# Patient Record
Sex: FEMALE | Race: WHITE | Employment: STUDENT | URBAN - METROPOLITAN AREA
[De-identification: names, ages, dates, MRNs, and addresses within clinical notes are randomized per-mention and may not be internally consistent; named-entity substitution may affect disease eponyms.]

---
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
## 2023-01-06 ENCOUNTER — OFFICE VISIT (OUTPATIENT)
Dept: FAMILY MEDICINE CLINIC | Age: 18
End: 2023-01-06

## 2023-01-06 VITALS
OXYGEN SATURATION: 98 % | RESPIRATION RATE: 16 BRPM | DIASTOLIC BLOOD PRESSURE: 76 MMHG | HEIGHT: 66 IN | WEIGHT: 230 LBS | TEMPERATURE: 98.2 F | SYSTOLIC BLOOD PRESSURE: 120 MMHG | BODY MASS INDEX: 36.96 KG/M2 | HEART RATE: 80 BPM

## 2023-01-06 DIAGNOSIS — M19.90 ARTHRITIS: ICD-10-CM

## 2023-01-06 DIAGNOSIS — Z76.89 ESTABLISHING CARE WITH NEW DOCTOR, ENCOUNTER FOR: Primary | ICD-10-CM

## 2023-01-06 DIAGNOSIS — B96.89 ACUTE BACTERIAL SINUSITIS: ICD-10-CM

## 2023-01-06 DIAGNOSIS — F41.9 ANXIETY: ICD-10-CM

## 2023-01-06 DIAGNOSIS — G43.911 INTRACTABLE MIGRAINE WITH STATUS MIGRAINOSUS, UNSPECIFIED MIGRAINE TYPE: ICD-10-CM

## 2023-01-06 DIAGNOSIS — J01.90 ACUTE BACTERIAL SINUSITIS: ICD-10-CM

## 2023-01-06 RX ORDER — INDOMETHACIN 75 MG/1
CAPSULE, EXTENDED RELEASE ORAL
COMMUNITY
Start: 2022-12-21

## 2023-01-06 RX ORDER — SUCRALFATE 1 G/1
TABLET ORAL
COMMUNITY
Start: 2022-12-12

## 2023-01-06 RX ORDER — RIMEGEPANT SULFATE 75 MG/75MG
TABLET, ORALLY DISINTEGRATING ORAL
COMMUNITY
Start: 2021-11-08

## 2023-01-06 RX ORDER — ATOGEPANT 60 MG/1
TABLET ORAL
COMMUNITY
Start: 2022-12-15

## 2023-01-06 RX ORDER — LORATADINE 10 MG/1
CAPSULE, LIQUID FILLED ORAL
COMMUNITY
Start: 2022-04-20

## 2023-01-06 RX ORDER — CETIRIZINE HYDROCHLORIDE 10 MG/1
TABLET ORAL
COMMUNITY
End: 2023-01-06

## 2023-01-06 RX ORDER — ZONISAMIDE 100 MG/1
CAPSULE ORAL
COMMUNITY
Start: 2022-12-21

## 2023-01-06 RX ORDER — AZITHROMYCIN 250 MG/1
250 TABLET, FILM COATED ORAL SEE ADMIN INSTRUCTIONS
Qty: 6 TABLET | Refills: 0 | Status: SHIPPED | OUTPATIENT
Start: 2023-01-06 | End: 2023-01-11

## 2023-01-06 RX ORDER — CHLORAL HYDRATE 500 MG
CAPSULE ORAL
COMMUNITY
Start: 2022-04-20

## 2023-01-06 RX ORDER — ALBUTEROL SULFATE 90 UG/1
AEROSOL, METERED RESPIRATORY (INHALATION)
COMMUNITY
Start: 2022-07-29

## 2023-01-06 RX ORDER — RIZATRIPTAN BENZOATE 10 MG/1
TABLET ORAL
COMMUNITY
Start: 2022-11-30

## 2023-01-06 RX ORDER — LASMIDITAN 100 MG/1
TABLET ORAL
COMMUNITY
Start: 2022-12-02

## 2023-01-06 RX ORDER — FLUOXETINE 10 MG/1
TABLET, FILM COATED ORAL
COMMUNITY
Start: 2022-12-21

## 2023-01-06 RX ORDER — NORETHINDRONE ACETATE/ETHINYL ESTRADIOL AND FERROUS FUMARATE 1MG-20(24)
KIT ORAL
COMMUNITY
Start: 2022-12-26

## 2023-01-06 RX ORDER — AZELASTINE 1 MG/ML
1 SPRAY, METERED NASAL 2 TIMES DAILY
COMMUNITY
Start: 2022-07-29 | End: 2023-07-29

## 2023-01-06 RX ORDER — ADALIMUMAB 40MG/0.4ML
KIT SUBCUTANEOUS
COMMUNITY
Start: 2022-12-05

## 2023-01-06 SDOH — ECONOMIC STABILITY: FOOD INSECURITY: WITHIN THE PAST 12 MONTHS, THE FOOD YOU BOUGHT JUST DIDN'T LAST AND YOU DIDN'T HAVE MONEY TO GET MORE.: NEVER TRUE

## 2023-01-06 SDOH — ECONOMIC STABILITY: FOOD INSECURITY: WITHIN THE PAST 12 MONTHS, YOU WORRIED THAT YOUR FOOD WOULD RUN OUT BEFORE YOU GOT MONEY TO BUY MORE.: NEVER TRUE

## 2023-01-06 ASSESSMENT — PATIENT HEALTH QUESTIONNAIRE - PHQ9
SUM OF ALL RESPONSES TO PHQ QUESTIONS 1-9: 0
1. LITTLE INTEREST OR PLEASURE IN DOING THINGS: 0
SUM OF ALL RESPONSES TO PHQ QUESTIONS 1-9: 0
SUM OF ALL RESPONSES TO PHQ QUESTIONS 1-9: 0
2. FEELING DOWN, DEPRESSED OR HOPELESS: 0
SUM OF ALL RESPONSES TO PHQ QUESTIONS 1-9: 0
SUM OF ALL RESPONSES TO PHQ9 QUESTIONS 1 & 2: 0

## 2023-01-06 ASSESSMENT — SOCIAL DETERMINANTS OF HEALTH (SDOH): HOW HARD IS IT FOR YOU TO PAY FOR THE VERY BASICS LIKE FOOD, HOUSING, MEDICAL CARE, AND HEATING?: NOT VERY HARD

## 2023-01-06 NOTE — PROGRESS NOTES
[facility] PRIMARY CARE  [address] [place] [address]  [place] [address]  Dept: [phone]  Dept Fax: [phone]     NAME: Sujit Baird        :  2005        MRN:  [id number]    Chief Complaint   Patient presents with    New Patient     Previous pcp was Dr. Andre Cobb    Pharyngitis    Nasal Congestion     Started at the beginning of the week. History of Present Illness  Sujit Baird is a 21 y.o. female who presents today to [facility] care. Patient with several chronic conditions for which she follows with several specialist through [address] [place] [address] including neurology, rheumatology, and cardiology. Sees orthopedics and allergy through [facility]. Hx includes chronic migraines, juvenile arthritis, and various orthopedic issues require surgery. Sore throat and congestion for the last week, her mom and brother were both sick with a lingering URI throughout [address] and were both treated with an antibiotic after several weeks of illness. Since she is currently taking humira, will proactively treat her URI with antibiotics. Review of Systems  Please see HPI above.      Medical History   Past Medical History:   Diagnosis Date    Migraine        Surgical History   Past Surgical History:   Procedure Laterality Date    LEG SURGERY Right     Christie    WISDOM TOOTH EXTRACTION         Family History  Family History   Problem Relation Age of Onset    Other Mother     High Blood Pressure Mother     Hypothyroidism Mother     Anxiety Disorder Brother        Social History  Social History     Tobacco Use    Smoking status: Never    Smokeless tobacco: Never   Substance Use Topics    Alcohol use: Yes     Comment: occasionally       Home Medications  Current Outpatient Medications   Medication Sig Dispense Refill    HUMIRA PEN 40 MG/0.4ML PNKT       albuterol sulfate HFA (PROVENTIL;VENTOLIN;PROAIR) 108 (90 Base) MCG/ACT inhaler Inhale into the lungs      QULIPTA 60 MG TABS Cholecalciferol 50 MCG (2000 UT) TABS Take by mouth      loratadine (CLARITIN) 10 MG capsule Take by mouth      FLUoxetine (PROZAC) 10 MG tablet TAKE 1 TABLET BY MOUTH ONCE DAILY IN THE MORNING FOR 90 DAYS      indomethacin (INDOCIN SR) 75 MG extended release capsule TAKE 1 CAPSULE BY MOUTH ONCE DAILY WITH A MEAL      REYVOW 100 MG TABS TAKE 1 TO 2 TABLETS BY MOUTH ONCE DAILY AS NEEDED FOR HEADACHE      GALI 24 FE 1-20 MG-MCG(24) TABS TAKE 1 TABLET BY MOUTH ONCE DAILY      Omega-3 Fatty Acids (FISH OIL) 1000 MG capsule Take by mouth      Rimegepant Sulfate (NURTEC) 75 MG TBDP Take by mouth      rizatriptan (MAXALT) 10 MG tablet TAKE ONE TABLET BY MOUTH AT ONSET OF HEADACHE, MAY REPEAT EVERY 2 HOURS AS NEEDED, MAXIMUM OF 3 TABLET PER 24 HOURS      sucralfate (CARAFATE) 1 GM tablet TAKE 1 TABLET BY MOUTH WITH INDOCIN      azelastine (ASTELIN) 0.1 % nasal spray 1 spray by Nasal route 2 times daily      zonisamide (ZONEGRAN) 100 MG capsule TAKE 2 CAPSULES BY MOUTH AT BEDTIME      azithromycin (ZITHROMAX) 250 MG tablet Take 1 tablet by mouth See Admin Instructions for 5 days 500mg on day 1 followed by 250mg on days 2 - 5 6 tablet 0     No current facility-administered medications for this visit. Allergies  Allergies   Allergen Reactions    Cobalt     Dust Mite Extract        Objective  Vitals:    01/06/23 0832   BP: 120/76   Pulse: 80   Resp: 16   Temp: 98.2 °F (36.8 °C)   TempSrc: Temporal   SpO2: 98%   Weight: 230 lb (104.3 kg)   Height: 5' 6\" (1.676 m)        Physical Exam:  General: Awake, alert, and oriented to person, place, time, and purpose, appears stated age and cooperative, No acute distress  Head: Normocephalic, atraumatic  Eyes: conjunctivae/corneas clear, EOM's intact. Neck: symmetrical, trachea midline  Back: symmetric, ROM normal, No CVA tenderness.   Lungs: clear to auscultation bilaterally without wheezes, rales, or rhonchi  Heart: regular rate and rhythm, S1, S2 normal, no murmur, click, rub or gallop  Abdomen: soft, non-tender; bowel sounds normal; no masses,  no organomegaly  Extremities: atraumatic, no cyanosis, no edema  Skin: color, texture, turgor within normal limits. Neurologic: speech appropriate, moves all 4 extremities, normal muscle strength and tone, CN 2-12 grossly intact    Labs  No results found for: WBC, HGB, HCT, PLT, NA, K, CL, CREATININE, BUN, CO2, GLUCOSE, ALT, AST, INR  No results found for: TSH  No results found for: TRIG  No results found for: HDL  No results found for: LDLCALC  No results found for: LABA1C  No results found for: INR, PROTIME   *All recent labs were reviewed. Please see electronic chart for a more comprehensive set of labs    Radiology  No results found. Health Maintenance Due   Topic Date Due    HIV screen  Never done    Chlamydia/GC screen  Never done    Hepatitis C screen  Never done    COVID-19 Vaccine (3 - Booster for Pfizer series) 06/30/2021    Flu vaccine (1) 08/01/2022         Assessment and Plan  Joe Carmona presents today to establish care. Liza was seen today for new patient, pharyngitis and nasal congestion. 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  Will request records as patient is unsure of specific diagnoses received. Records are available form [facility] and these were reviewed. - Non-radiographic axial spondyloarthritis, low grade otic glioma, right temporal lobe vascular malformation are diagnoses mentions throughout orthopedic and neurology records available through [email or web address] and/or home exercises printed for patient's review and were included in patient instructions on his/her After Visit Summary and given to patient at the end of visit. Counseled regarding above diagnosis, including possible risks and complications, especially if left uncontrolled. Counseled regarding the possible side effects, risks, benefits and alternatives to treatment; patient and/or guardian verbalizes understanding, agrees, feels comfortable with and wishes to proceed with above treatment plan. Advised patient to call [employer] new medication issues, and read all Rx info from pharmacy to assure aware of all possible risks and side effects of medication before taking. Reviewed age and gender appropriate health screening exams and vaccinations. Advised patient regarding importance of keeping up with recommended health maintenance and to schedule as soon as possible if overdue, as this is important in assessing for undiagnosed pathology, especially cancer, as well as protecting against potentially harmful/life threatening disease. Patient verbalizes understanding and agrees with above counseling, assessment and plan. All questions answered.     Karin Akins,

## 2023-08-16 ENCOUNTER — APPOINTMENT (OUTPATIENT)
Dept: LAB | Facility: LAB | Age: 18
End: 2023-08-16
Payer: COMMERCIAL

## 2023-08-16 LAB
ALANINE AMINOTRANSFERASE (SGPT) (U/L) IN SER/PLAS: 25 U/L (ref 7–45)
ALBUMIN (G/DL) IN SER/PLAS: 4.8 G/DL (ref 3.4–5)
ALKALINE PHOSPHATASE (U/L) IN SER/PLAS: 49 U/L (ref 33–110)
ANION GAP IN SER/PLAS: 16 MMOL/L (ref 10–20)
ASPARTATE AMINOTRANSFERASE (SGOT) (U/L) IN SER/PLAS: 22 U/L (ref 9–39)
BILIRUBIN TOTAL (MG/DL) IN SER/PLAS: 1.7 MG/DL (ref 0–1.2)
C PEPTIDE (NG/ML) IN SER/PLAS: 2.8 NG/ML (ref 0.7–3.9)
CALCIUM (MG/DL) IN SER/PLAS: 9.9 MG/DL (ref 8.6–10.6)
CARBON DIOXIDE, TOTAL (MMOL/L) IN SER/PLAS: 27 MMOL/L (ref 21–32)
CHLORIDE (MMOL/L) IN SER/PLAS: 104 MMOL/L (ref 98–107)
CHOLESTEROL (MG/DL) IN SER/PLAS: 160 MG/DL (ref 0–199)
CHOLESTEROL IN HDL (MG/DL) IN SER/PLAS: 43.8 MG/DL
CHOLESTEROL/HDL RATIO: 3.7
CORTISOL (UG/DL) IN SERUM: 11.3 UG/DL (ref 2.5–20)
CREATININE (MG/DL) IN SER/PLAS: 0.94 MG/DL (ref 0.5–1.05)
DEHYDROEPIANDROSTERONE SULFATE (DHEA-S) (UG/DL) IN SER/: 146 UG/DL (ref 65–395)
ESTRADIOL (PG/ML) IN SER/PLAS: 34 PG/ML
FOLLITROPIN (IU/L) IN SER/PLAS: 6.3 IU/L
GFR FEMALE: 89 ML/MIN/1.73M2
GLUCOSE (MG/DL) IN SER/PLAS: 84 MG/DL (ref 74–99)
INSULIN, FASTING: 15 UIU/ML (ref 3–25)
LDL: 92 MG/DL (ref 0–109)
LUTEINIZING HORMONE (IU/ML) IN SER/PLAS: 11.9 IU/L
NON HDL CHOLESTEROL: 116 MG/DL (ref 0–119)
PARATHYRIN INTACT (PG/ML) IN SER/PLAS: 48.8 PG/ML (ref 18.5–88)
POTASSIUM (MMOL/L) IN SER/PLAS: 4.2 MMOL/L (ref 3.5–5.3)
PROLACTIN (UG/L) IN SER/PLAS: 14.3 UG/L (ref 3–20)
PROTEIN TOTAL: 7.1 G/DL (ref 6.4–8.2)
SODIUM (MMOL/L) IN SER/PLAS: 143 MMOL/L (ref 136–145)
THYROTROPIN (MIU/L) IN SER/PLAS BY DETECTION LIMIT <= 0.05 MIU/L: 2.49 MIU/L (ref 0.44–3.98)
THYROXINE (T4) FREE (NG/DL) IN SER/PLAS: 1.27 NG/DL (ref 0.78–1.48)
TRIGLYCERIDE (MG/DL) IN SER/PLAS: 122 MG/DL (ref 0–149)
UREA NITROGEN (MG/DL) IN SER/PLAS: 12 MG/DL (ref 6–23)
VLDL: 24 MG/DL (ref 0–40)

## 2023-08-18 LAB
ADRENOCORTICOTROPIC HORMONE: 20.8 PG/ML (ref 7.2–63.3)
ESTIMATED AVERAGE GLUCOSE FOR HBA1C: 85 MG/DL
HEMOGLOBIN A1C/HEMOGLOBIN TOTAL IN BLOOD: 4.6 %
IGF 1 (INSULIN-LIKE GROWTH FACTOR 1): 209 NG/ML (ref 109–372)
IGF 1 Z SCORE CALCULATION: -0.3
SEX HORMONE BINDING GLOBULIN (NMOL/L) IN SER/PLAS: 34.5 NMOL/L (ref 18.2–135.5)

## 2023-08-21 LAB
17-HYDROXYPROGESTERONE (REFLAB): 25.23 NG/DL
ANDROSTENEDIONE (NG/DL) IN SER/PLAS: 1.51 NG/ML (ref 0.26–2.14)

## 2023-08-22 LAB
TESTOSTERONE FREE (CHAN): 5.1 PG/ML (ref 0.1–6.4)
TESTOSTERONE,TOTAL,LC-MS/MS: 41 NG/DL (ref 2–45)

## 2023-08-31 PROBLEM — J01.90 ACUTE BACTERIAL SINUSITIS: Status: ACTIVE | Noted: 2023-01-06

## 2023-08-31 PROBLEM — C72.30: Status: ACTIVE | Noted: 2023-08-31

## 2023-08-31 PROBLEM — G43.911 INTRACTABLE MIGRAINE WITH STATUS MIGRAINOSUS: Status: ACTIVE | Noted: 2023-01-06

## 2023-08-31 PROBLEM — H47.099: Status: ACTIVE | Noted: 2023-08-31

## 2023-08-31 PROBLEM — M94.0 COSTOCHONDRITIS: Status: ACTIVE | Noted: 2020-11-18

## 2023-08-31 PROBLEM — F41.9 ANXIETY AND DEPRESSION: Status: ACTIVE | Noted: 2023-01-06

## 2023-08-31 PROBLEM — G44.009 CLUSTER HEADACHE: Status: ACTIVE | Noted: 2023-08-31

## 2023-08-31 PROBLEM — M54.50 CHRONIC LOW BACK PAIN: Status: ACTIVE | Noted: 2023-08-31

## 2023-08-31 PROBLEM — R22.0 RIGHT FACIAL SWELLING: Status: ACTIVE | Noted: 2023-08-31

## 2023-08-31 PROBLEM — J30.89 NON-SEASONAL ALLERGIC RHINITIS: Status: ACTIVE | Noted: 2021-02-22

## 2023-08-31 PROBLEM — R03.0 ELEVATED BLOOD PRESSURE READING WITHOUT DIAGNOSIS OF HYPERTENSION: Status: ACTIVE | Noted: 2023-08-31

## 2023-08-31 PROBLEM — G93.9 BRAIN LESION: Status: ACTIVE | Noted: 2023-08-31

## 2023-08-31 PROBLEM — M19.90 ARTHRITIS: Status: ACTIVE | Noted: 2023-01-06

## 2023-08-31 PROBLEM — R07.9 CHEST PAIN: Status: ACTIVE | Noted: 2023-08-31

## 2023-08-31 PROBLEM — M54.2 CERVICALGIA: Status: ACTIVE | Noted: 2023-08-31

## 2023-08-31 PROBLEM — L81.3 CAFE AU LAIT SPOTS: Status: ACTIVE | Noted: 2023-08-31

## 2023-08-31 PROBLEM — B96.89 ACUTE BACTERIAL SINUSITIS: Status: ACTIVE | Noted: 2023-01-06

## 2023-08-31 PROBLEM — L20.9 ATOPIC DERMATITIS: Status: ACTIVE | Noted: 2021-03-29

## 2023-08-31 PROBLEM — M79.604 PAIN OF RIGHT LOWER EXTREMITY: Status: ACTIVE | Noted: 2023-08-31

## 2023-08-31 PROBLEM — J45.909 ASTHMA, MODERATE (HHS-HCC): Status: ACTIVE | Noted: 2023-08-31

## 2023-08-31 PROBLEM — L81.2 FRECKLING, AXILLARY/INGUINAL: Status: ACTIVE | Noted: 2023-08-31

## 2023-08-31 PROBLEM — R51.9 HEADACHE: Status: ACTIVE | Noted: 2020-11-18

## 2023-08-31 PROBLEM — F32.A ANXIETY AND DEPRESSION: Status: ACTIVE | Noted: 2023-01-06

## 2023-08-31 PROBLEM — R90.89 ABNORMAL BRAIN MRI: Status: ACTIVE | Noted: 2023-08-31

## 2023-08-31 PROBLEM — M79.A21 NON-TRAUMATIC COMPARTMENT SYNDROME OF RIGHT LOWER EXTREMITY: Status: ACTIVE | Noted: 2023-08-31

## 2023-08-31 PROBLEM — G44.40 REBOUND HEADACHE: Status: ACTIVE | Noted: 2023-08-31

## 2023-08-31 PROBLEM — R53.83 OTHER FATIGUE: Status: ACTIVE | Noted: 2020-11-18

## 2023-08-31 PROBLEM — G89.29 CHRONIC LOW BACK PAIN: Status: ACTIVE | Noted: 2023-08-31

## 2023-08-31 PROBLEM — E78.5 DYSLIPIDEMIA: Status: ACTIVE | Noted: 2023-08-31

## 2023-08-31 PROBLEM — L24.81 IRRITANT CONTACT DERMATITIS DUE TO METALS: Status: ACTIVE | Noted: 2021-03-29

## 2023-08-31 PROBLEM — R52 PAIN: Status: ACTIVE | Noted: 2023-08-31

## 2023-08-31 PROBLEM — J45.20 MILD INTERMITTENT ASTHMA WITHOUT COMPLICATION (HHS-HCC): Status: ACTIVE | Noted: 2021-02-22

## 2023-08-31 PROBLEM — M45.A0 NON-RADIOGRAPHIC AXIAL SPONDYLOARTHRITIS (MULTI): Status: ACTIVE | Noted: 2023-08-31

## 2023-08-31 PROBLEM — G44.209 TENSION HEADACHE: Status: ACTIVE | Noted: 2023-08-31

## 2023-08-31 PROBLEM — G43.009 MIGRAINE WITHOUT AURA AND WITHOUT STATUS MIGRAINOSUS, NOT INTRACTABLE: Status: ACTIVE | Noted: 2023-08-31

## 2023-08-31 PROBLEM — M25.561 RIGHT KNEE PAIN: Status: ACTIVE | Noted: 2023-08-31

## 2023-08-31 PROBLEM — Q99.9 GENETIC SYNDROME (HHS-HCC): Status: ACTIVE | Noted: 2023-08-31

## 2023-08-31 RX ORDER — SUMATRIPTAN SUCCINATE 25 MG/1
TABLET ORAL
COMMUNITY

## 2023-08-31 RX ORDER — ZONISAMIDE 100 MG/1
CAPSULE ORAL 2 TIMES DAILY
COMMUNITY
Start: 2022-02-23 | End: 2023-11-17 | Stop reason: ALTCHOICE

## 2023-08-31 RX ORDER — NORETHINDRONE ACETATE/ETHINYL ESTRADIOL AND FERROUS FUMARATE 1MG-20(24)
1 KIT ORAL DAILY
COMMUNITY
End: 2023-11-17 | Stop reason: ALTCHOICE

## 2023-08-31 RX ORDER — LASMIDITAN 100 MG/1
TABLET ORAL
COMMUNITY
End: 2023-12-07

## 2023-08-31 RX ORDER — CEFUROXIME AXETIL 250 MG/1
TABLET ORAL
COMMUNITY
Start: 2022-07-26

## 2023-08-31 RX ORDER — VERAPAMIL HYDROCHLORIDE 100 MG/1
100 CAPSULE, EXTENDED RELEASE ORAL
COMMUNITY
Start: 2020-11-18 | End: 2023-11-17 | Stop reason: ALTCHOICE

## 2023-08-31 RX ORDER — SUMATRIPTAN SUCCINATE 3 MG/1
INJECTION, SOLUTION SUBCUTANEOUS
COMMUNITY
Start: 2022-04-20 | End: 2023-11-17 | Stop reason: ALTCHOICE

## 2023-08-31 RX ORDER — TALC
2 POWDER (GRAM) TOPICAL NIGHTLY
COMMUNITY
Start: 2020-11-18

## 2023-08-31 RX ORDER — SUCRALFATE 1 G/1
TABLET ORAL
COMMUNITY
Start: 2022-04-20

## 2023-08-31 RX ORDER — RIZATRIPTAN BENZOATE 10 MG/1
TABLET ORAL
COMMUNITY
End: 2023-12-07

## 2023-10-05 ASSESSMENT — SLEEP AND FATIGUE QUESTIONNAIRES
HOW LIKELY ARE YOU TO NOD OFF OR FALL ASLEEP WHILE SITTING AND TALKING TO SOMEONE: NO CHANCE OF DOZING
HOW LIKELY ARE YOU TO NOD OFF OR FALL ASLEEP WHILE SITTING QUIETLY AFTER LUNCH WITHOUT ALCOHOL: SLIGHT CHANCE OF DOZING
ESS TOTAL SCORE: 9
HOW LIKELY ARE YOU TO NOD OFF OR FALL ASLEEP WHILE WATCHING TV: SLIGHT CHANCE OF DOZING
HOW LIKELY ARE YOU TO NOD OFF OR FALL ASLEEP WHEN YOU ARE A PASSENGER IN A CAR FOR AN HOUR WITHOUT A BREAK: MODERATE CHANCE OF DOZING
HOW LIKELY ARE YOU TO NOD OFF OR FALL ASLEEP WHILE SITTING AND READING: SLIGHT CHANCE OF DOZING
SITING INACTIVE IN A PUBLIC PLACE LIKE A CLASS ROOM OR A MOVIE THEATER: SLIGHT CHANCE OF DOZING
HOW LIKELY ARE YOU TO NOD OFF OR FALL ASLEEP WHILE LYING DOWN TO REST IN THE AFTERNOON WHEN CIRCUMSTANCES PERMIT: HIGH CHANCE OF DOZING
HOW LIKELY ARE YOU TO NOD OFF OR FALL ASLEEP IN A CAR, WHILE STOPPED FOR A FEW MINUTES IN TRAFFIC: NO CHANCE OF DOZING

## 2023-10-06 DIAGNOSIS — G43.711 INTRACTABLE CHRONIC MIGRAINE WITHOUT AURA AND WITH STATUS MIGRAINOSUS: ICD-10-CM

## 2023-10-06 RX ORDER — DROSPIRENONE 4 MG/1
1 TABLET, FILM COATED ORAL DAILY
COMMUNITY
Start: 2023-08-29

## 2023-10-06 RX ORDER — AZELASTINE 1 MG/ML
1 SPRAY, METERED NASAL
COMMUNITY
Start: 2022-07-29

## 2023-10-06 RX ORDER — HYDROCORTISONE 25 MG/G
OINTMENT TOPICAL 2 TIMES DAILY
COMMUNITY
Start: 2021-02-19

## 2023-10-06 RX ORDER — ALBUTEROL SULFATE 90 UG/1
2 AEROSOL, METERED RESPIRATORY (INHALATION) EVERY 6 HOURS PRN
COMMUNITY

## 2023-10-06 RX ORDER — INDOMETHACIN 75 MG/1
CAPSULE, EXTENDED RELEASE ORAL
COMMUNITY
End: 2024-01-22

## 2023-10-06 RX ORDER — FLUOXETINE 10 MG/1
20 TABLET ORAL
COMMUNITY

## 2023-10-06 RX ORDER — CERTOLIZUMAB PEGOL 200 MG/ML
INJECTION, SOLUTION SUBCUTANEOUS
COMMUNITY
Start: 2022-11-22 | End: 2023-11-17 | Stop reason: ALTCHOICE

## 2023-10-06 RX ORDER — ATOGEPANT 60 MG/1
60 TABLET ORAL DAILY
COMMUNITY
End: 2024-05-21

## 2023-10-06 RX ORDER — CHOLECALCIFEROL (VITAMIN D3) 50 MCG
TABLET ORAL
COMMUNITY
Start: 2022-04-20

## 2023-10-06 NOTE — PROGRESS NOTES
Intractable migraine headaches. Home abortives are not breaking the cycle. Toradol IM and PO protocol has broken cycle in past. Nurse visit today for IM toradol. To follow with oral protocol starting tomorrow  every 6 hours for 5 days. Take with food to avoid stomach upset.

## 2023-10-09 RX ORDER — METHYLPREDNISOLONE 4 MG/1
TABLET ORAL
Qty: 21 TABLET | Refills: 0 | Status: SHIPPED | OUTPATIENT
Start: 2023-10-09 | End: 2023-10-13

## 2023-10-09 RX ORDER — METHYLPREDNISOLONE 4 MG/1
TABLET ORAL
Qty: 21 TABLET | Refills: 0 | Status: CANCELLED | OUTPATIENT
Start: 2023-10-09 | End: 2023-10-16

## 2023-10-12 ENCOUNTER — CLINICAL SUPPORT (OUTPATIENT)
Dept: SLEEP MEDICINE | Facility: HOSPITAL | Age: 18
End: 2023-10-12
Payer: COMMERCIAL

## 2023-10-12 DIAGNOSIS — G47.33 OBSTRUCTIVE SLEEP APNEA (ADULT) (PEDIATRIC): ICD-10-CM

## 2023-10-12 DIAGNOSIS — G47.9 SLEEP DISORDER, UNSPECIFIED: ICD-10-CM

## 2023-10-12 PROCEDURE — 95810 POLYSOM 6/> YRS 4/> PARAM: CPT | Performed by: INTERNAL MEDICINE

## 2023-10-13 NOTE — PROGRESS NOTES
[facility] TECH NOTE:     Patient: Radha Pereira   MRN//AGE: [id number]  2005  18 y.o.   Technologist: Madina Engel   Room: 1   Service Date: 10/13/2023        Sleep Testing Location: [facility]     [place]:     TECHNOLOGIST SLEEP STUDY PROCEDURE NOTE:   This sleep study is being conducted according to the policies and procedures outlined by the AAS accreditation standards.  The sleep study procedure and processes involved during this appointment was explained to the patient/patient’s family, questions were answered. The patient/family verbalized understanding.      The patient is a 18 y.o. year old female scheduled for a PSG  with montage of:  PSG . she arrived for her appointment.      The study that was ultimately completed was a PSG  with montage of:  PSG .    The full study Was completed.  Patient questionnaires completed?: yes     Consents signed? yes    Initial Fall Risk Screening:     Radha has not fallen in the last 6 months. her did not result in injury. Radha does not have a fear of falling. He does not need assistance with sitting, standing, or walking. she does not need assistance walking in her home. she does not need assistance in an unfamiliar setting. The patient is notusing an assistive device.     Brief Study observations: PSG     Deviation to order/protocol and reason: no      If PAP, which was preferred mask/pressure/mode: no      Other: none    After the procedure, the patient/family was informed to ensure followup with ordering clinician for testing results.      Technologist: Madina Engel

## 2023-10-19 ENCOUNTER — PHARMACY VISIT (OUTPATIENT)
Dept: PHARMACY | Facility: CLINIC | Age: 18
End: 2023-10-19
Payer: MEDICAID

## 2023-10-19 ENCOUNTER — SPECIALTY PHARMACY (OUTPATIENT)
Dept: PHARMACY | Facility: CLINIC | Age: 18
End: 2023-10-19

## 2023-10-19 PROCEDURE — RXMED WILLOW AMBULATORY MEDICATION CHARGE

## 2023-11-16 ENCOUNTER — SPECIALTY PHARMACY (OUTPATIENT)
Dept: PHARMACY | Facility: CLINIC | Age: 18
End: 2023-11-16

## 2023-11-17 ENCOUNTER — OFFICE VISIT (OUTPATIENT)
Dept: RHEUMATOLOGY | Facility: CLINIC | Age: 18
End: 2023-11-17
Payer: COMMERCIAL

## 2023-11-17 ENCOUNTER — LAB (OUTPATIENT)
Dept: LAB | Facility: LAB | Age: 18
End: 2023-11-17
Payer: COMMERCIAL

## 2023-11-17 VITALS — WEIGHT: 229 LBS | DIASTOLIC BLOOD PRESSURE: 64 MMHG | BODY MASS INDEX: 37.83 KG/M2 | SYSTOLIC BLOOD PRESSURE: 102 MMHG

## 2023-11-17 DIAGNOSIS — G89.29 CHRONIC LOW BACK PAIN WITHOUT SCIATICA, UNSPECIFIED BACK PAIN LATERALITY: ICD-10-CM

## 2023-11-17 DIAGNOSIS — M54.50 CHRONIC LOW BACK PAIN WITHOUT SCIATICA, UNSPECIFIED BACK PAIN LATERALITY: ICD-10-CM

## 2023-11-17 DIAGNOSIS — M45.A0 NON-RADIOGRAPHIC AXIAL SPONDYLOARTHRITIS (MULTI): ICD-10-CM

## 2023-11-17 DIAGNOSIS — M45.A0 NON-RADIOGRAPHIC AXIAL SPONDYLOARTHRITIS (MULTI): Primary | ICD-10-CM

## 2023-11-17 LAB
ALBUMIN SERPL BCP-MCNC: 4.7 G/DL (ref 3.4–5)
ALP SERPL-CCNC: 43 U/L (ref 33–110)
ALT SERPL W P-5'-P-CCNC: 24 U/L (ref 7–45)
ANION GAP SERPL CALC-SCNC: 11 MMOL/L (ref 10–20)
AST SERPL W P-5'-P-CCNC: 15 U/L (ref 9–39)
BASOPHILS # BLD AUTO: 0.09 X10*3/UL (ref 0–0.1)
BASOPHILS NFR BLD AUTO: 0.9 %
BILIRUB SERPL-MCNC: 1.1 MG/DL (ref 0–1.2)
BUN SERPL-MCNC: 12 MG/DL (ref 6–23)
CALCIUM SERPL-MCNC: 9.6 MG/DL (ref 8.6–10.6)
CHLORIDE SERPL-SCNC: 105 MMOL/L (ref 98–107)
CO2 SERPL-SCNC: 28 MMOL/L (ref 21–32)
CREAT SERPL-MCNC: 0.86 MG/DL (ref 0.5–1.05)
CRP SERPL-MCNC: 0.14 MG/DL
EOSINOPHIL # BLD AUTO: 0.92 X10*3/UL (ref 0–0.7)
EOSINOPHIL NFR BLD AUTO: 9.1 %
ERYTHROCYTE [DISTWIDTH] IN BLOOD BY AUTOMATED COUNT: 11.9 % (ref 11.5–14.5)
GFR SERPL CREATININE-BSD FRML MDRD: >90 ML/MIN/1.73M*2
GLUCOSE SERPL-MCNC: 87 MG/DL (ref 74–99)
HCT VFR BLD AUTO: 41.2 % (ref 36–46)
HGB BLD-MCNC: 13.8 G/DL (ref 12–16)
IMM GRANULOCYTES # BLD AUTO: 0.01 X10*3/UL (ref 0–0.7)
IMM GRANULOCYTES NFR BLD AUTO: 0.1 % (ref 0–0.9)
LYMPHOCYTES # BLD AUTO: 3.98 X10*3/UL (ref 1.2–4.8)
LYMPHOCYTES NFR BLD AUTO: 39.2 %
MCH RBC QN AUTO: 30 PG (ref 26–34)
MCHC RBC AUTO-ENTMCNC: 33.5 G/DL (ref 32–36)
MCV RBC AUTO: 90 FL (ref 80–100)
MONOCYTES # BLD AUTO: 0.76 X10*3/UL (ref 0.1–1)
MONOCYTES NFR BLD AUTO: 7.5 %
NEUTROPHILS # BLD AUTO: 4.4 X10*3/UL (ref 1.2–7.7)
NEUTROPHILS NFR BLD AUTO: 43.2 %
NRBC BLD-RTO: 0 /100 WBCS (ref 0–0)
PLATELET # BLD AUTO: 277 X10*3/UL (ref 150–450)
POTASSIUM SERPL-SCNC: 3.9 MMOL/L (ref 3.5–5.3)
PROT SERPL-MCNC: 7 G/DL (ref 6.4–8.2)
RBC # BLD AUTO: 4.6 X10*6/UL (ref 4–5.2)
SODIUM SERPL-SCNC: 140 MMOL/L (ref 136–145)
WBC # BLD AUTO: 10.2 X10*3/UL (ref 4.4–11.3)

## 2023-11-17 PROCEDURE — 99214 OFFICE O/P EST MOD 30 MIN: CPT | Performed by: INTERNAL MEDICINE

## 2023-11-17 PROCEDURE — 80053 COMPREHEN METABOLIC PANEL: CPT

## 2023-11-17 PROCEDURE — 36415 COLL VENOUS BLD VENIPUNCTURE: CPT

## 2023-11-17 PROCEDURE — 85025 COMPLETE CBC W/AUTO DIFF WBC: CPT

## 2023-11-17 PROCEDURE — 86140 C-REACTIVE PROTEIN: CPT

## 2023-11-17 RX ORDER — CERTOLIZUMAB PEGOL 200 MG/ML
INJECTION, SOLUTION SUBCUTANEOUS
Qty: 6 ML | Refills: 0 | Status: SHIPPED | OUTPATIENT
Start: 2023-11-17 | End: 2024-04-29 | Stop reason: ALTCHOICE

## 2023-11-17 RX ORDER — METFORMIN HYDROCHLORIDE EXTENDED-RELEASE TABLETS 1000 MG/1
1000 TABLET, FILM COATED, EXTENDED RELEASE ORAL
COMMUNITY

## 2023-11-17 RX ORDER — ADALIMUMAB 40MG/0.4ML
KIT SUBCUTANEOUS
Qty: 12 EACH | Refills: 1 | OUTPATIENT
Start: 2023-11-17 | End: 2024-11-15

## 2023-11-17 NOTE — PROGRESS NOTES
Recheck  Non-radiographic Spondy  Resolved low back pain but new mid back pain x 4 weeks ago.    HPI - she gets some LBP prior to humira for 2-4 days, and she now has 4 wks of mid-back pain, whereas it had only been tight prior.  She takes indomethacin daily from neuro for HA.  No other pain.  No swelling.  AM stiffness 30-45 min.  Working increases her pain - she is on her ft 8-9 hrs per day.  No CP or resp.  Some GI since starting metformin    PE  NAD  RRR no r/m/g  CTA  No edema  No synovitis  NT and no pain with ROM throughout    A/P - noradiographic SpA - may not be responding optimally to humira  Will change to cimzia  Consider IL17 if sx persist  Check labs  Reeval 3 mo or sooner PRN

## 2023-11-25 ENCOUNTER — HOSPITAL ENCOUNTER (EMERGENCY)
Age: 18
Discharge: HOME | End: 2023-11-25
Payer: COMMERCIAL

## 2023-11-25 VITALS
HEIGHT: 66 IN | WEIGHT: 225 LBS | BODY MASS INDEX: 36.16 KG/M2 | OXYGEN SATURATION: 100 % | HEART RATE: 82 BPM | RESPIRATION RATE: 16 BRPM | TEMPERATURE: 98.3 F

## 2023-11-25 DIAGNOSIS — M79.5 FOREIGN BODY (FB) IN SOFT TISSUE: Primary | ICD-10-CM

## 2023-11-25 PROCEDURE — 6370000000 HC RX 637 (ALT 250 FOR IP): Performed by: NURSE PRACTITIONER

## 2023-11-25 PROCEDURE — 2500000003 HC RX 250 WO HCPCS: Performed by: NURSE PRACTITIONER

## 2023-11-25 PROCEDURE — 99283 EMERGENCY DEPT VISIT LOW MDM: CPT

## 2023-11-25 RX ORDER — BACITRACIN ZINC 500 [USP'U]/G
OINTMENT TOPICAL ONCE
Status: COMPLETED | OUTPATIENT
Start: 2023-11-25 | End: 2023-11-25

## 2023-11-25 RX ORDER — CEPHALEXIN 500 MG/1
500 CAPSULE ORAL ONCE
Status: COMPLETED | OUTPATIENT
Start: 2023-11-25 | End: 2023-11-25

## 2023-11-25 RX ORDER — CEPHALEXIN 500 MG/1
500 CAPSULE ORAL 3 TIMES DAILY
Qty: 21 CAPSULE | Refills: 0 | Status: SHIPPED | OUTPATIENT
Start: 2023-11-25 | End: 2023-12-02

## 2023-11-25 RX ORDER — LIDOCAINE HYDROCHLORIDE 10 MG/ML
5 INJECTION, SOLUTION INFILTRATION; PERINEURAL ONCE
Status: COMPLETED | OUTPATIENT
Start: 2023-11-25 | End: 2023-11-25

## 2023-11-25 RX ORDER — ONDANSETRON 4 MG/1
8 TABLET, ORALLY DISINTEGRATING ORAL ONCE
Status: COMPLETED | OUTPATIENT
Start: 2023-11-25 | End: 2023-11-25

## 2023-11-25 RX ADMIN — LIDOCAINE HYDROCHLORIDE 5 ML: 10 INJECTION, SOLUTION INFILTRATION; PERINEURAL at 03:23

## 2023-11-25 RX ADMIN — ONDANSETRON 8 MG: 4 TABLET, ORALLY DISINTEGRATING ORAL at 03:03

## 2023-11-25 RX ADMIN — BACITRACIN ZINC: 500 OINTMENT TOPICAL at 03:24

## 2023-11-25 RX ADMIN — CEPHALEXIN 500 MG: 500 CAPSULE ORAL at 02:52

## 2023-11-25 ASSESSMENT — LIFESTYLE VARIABLES
HOW OFTEN DO YOU HAVE A DRINK CONTAINING ALCOHOL: NEVER
HOW MANY STANDARD DRINKS CONTAINING ALCOHOL DO YOU HAVE ON A TYPICAL DAY: PATIENT DOES NOT DRINK

## 2023-11-25 ASSESSMENT — PAIN - FUNCTIONAL ASSESSMENT: PAIN_FUNCTIONAL_ASSESSMENT: NONE - DENIES PAIN

## 2023-11-25 NOTE — DISCHARGE INSTRUCTIONS
Avoid direct weightbearing to left heel, follow-up with primary care doctor for wound recheck. Today we were unsuccessful getting out the foreign body of the sliver of glass. It will work its way out. Do not touch or squeeze at the area. Take antibiotic as prescribed.

## 2023-11-29 DIAGNOSIS — M45.A0 NON-RADIOGRAPHIC AXIAL SPONDYLOARTHRITIS (MULTI): ICD-10-CM

## 2023-11-29 RX ORDER — CERTOLIZUMAB PEGOL 200 MG/ML
INJECTION, SOLUTION SUBCUTANEOUS
Qty: 6 ML | Refills: 2 | Status: CANCELLED | OUTPATIENT
Start: 2023-11-29

## 2023-12-06 DIAGNOSIS — G43.009 MIGRAINE WITHOUT AURA AND WITHOUT STATUS MIGRAINOSUS, NOT INTRACTABLE: ICD-10-CM

## 2023-12-07 RX ORDER — LASMIDITAN 100 MG/1
TABLET ORAL
Qty: 8 TABLET | Refills: 5 | Status: SHIPPED | OUTPATIENT
Start: 2023-12-07

## 2023-12-07 RX ORDER — RIZATRIPTAN BENZOATE 10 MG/1
TABLET ORAL
Qty: 9 TABLET | Refills: 6 | Status: SHIPPED | OUTPATIENT
Start: 2023-12-07

## 2024-01-05 DIAGNOSIS — M45.A0 NON-RADIOGRAPHIC AXIAL SPONDYLOARTHRITIS (MULTI): ICD-10-CM

## 2024-01-05 DIAGNOSIS — M45.A0 NON-RADIOGRAPHIC AXIAL SPONDYLOARTHRITIS (MULTI): Primary | ICD-10-CM

## 2024-01-05 RX ORDER — IXEKIZUMAB 80 MG/ML
80 INJECTION, SOLUTION SUBCUTANEOUS
Qty: 1 ML | Refills: 3 | Status: SHIPPED | OUTPATIENT
Start: 2024-01-05 | End: 2024-04-25 | Stop reason: SDUPTHER

## 2024-01-10 ENCOUNTER — SPECIALTY PHARMACY (OUTPATIENT)
Dept: PHARMACY | Facility: CLINIC | Age: 19
End: 2024-01-10

## 2024-01-10 ENCOUNTER — PHARMACY VISIT (OUTPATIENT)
Dept: PHARMACY | Facility: CLINIC | Age: 19
End: 2024-01-10
Payer: MEDICAID

## 2024-01-10 PROCEDURE — RXMED WILLOW AMBULATORY MEDICATION CHARGE

## 2024-01-12 ENCOUNTER — SPECIALTY PHARMACY (OUTPATIENT)
Dept: PHARMACY | Facility: CLINIC | Age: 19
End: 2024-01-12

## 2024-01-12 NOTE — PROGRESS NOTES
[facility] Clinical Note    Radha Pereira is a 18 y.o. female, who is on the specialty pharmacy service for management of: Rheumatology Core with status of: (Enrolled)     Radha was contacted on 1/12/2024.    Refer to the encounter summary report for documentation details about patient counseling and education.      Medication Adherence  The importance of adherence was discussed with the patient and they were advised to take the medication as prescribed by their provider. Radha was encouraged to call her physician's office if they have a question regarding a missed dose.     Conclusion  Rate your quality of life on scale of 1-10: -- (unable to assess)  Rate your satisfaction with  Specialty Pharmacy on scale of 1-10: -- (unable to assess)      Patient advised to contact the pharmacy if there are any changes to her medication list, including prescriptions, OTC medications, herbal products, or supplements. Patient was advised of [facility]’s dispensing process, refill timeline, contact information ([phone]), and patient management follow up. Patient confirmed understanding of education conducted during assessment. All patient questions and concerns were addressed to the best of my ability. Patient was encouraged to contact the specialty pharmacy with any questions or concerns.    Confirmed follow-up outreaches are properly scheduled. Reviewed goals of therapy in the program targets.    Flor Mcbride, PharmD

## 2024-01-15 ENCOUNTER — APPOINTMENT (OUTPATIENT)
Dept: NEUROLOGY | Facility: CLINIC | Age: 19
End: 2024-01-15
Payer: COMMERCIAL

## 2024-01-21 DIAGNOSIS — G43.709 CHRONIC MIGRAINE WITHOUT AURA WITHOUT STATUS MIGRAINOSUS, NOT INTRACTABLE: ICD-10-CM

## 2024-01-22 RX ORDER — INDOMETHACIN 75 MG/1
CAPSULE, EXTENDED RELEASE ORAL
Qty: 30 CAPSULE | Refills: 6 | Status: SHIPPED | OUTPATIENT
Start: 2024-01-22

## 2024-01-25 ENCOUNTER — TELEPHONE (OUTPATIENT)
Dept: NEUROLOGY | Facility: CLINIC | Age: 19
End: 2024-01-25
Payer: COMMERCIAL

## 2024-01-25 DIAGNOSIS — G43.711 CHRONIC MIGRAINE WITHOUT AURA, INTRACTABLE, WITH STATUS MIGRAINOSUS: Primary | ICD-10-CM

## 2024-01-25 RX ORDER — METHYLPREDNISOLONE 4 MG/1
TABLET ORAL
Qty: 21 TABLET | Refills: 0 | Status: SHIPPED | OUTPATIENT
Start: 2024-01-25 | End: 2024-02-01

## 2024-01-25 NOTE — TELEPHONE ENCOUNTER
Radha has has a headache for 2 to 3 days and would like a Medrol Pack sent To   [facility] [address] [place] [address] - [address]

## 2024-01-30 ENCOUNTER — TELEPHONE (OUTPATIENT)
Dept: NEUROLOGY | Facility: CLINIC | Age: 19
End: 2024-01-30
Payer: COMMERCIAL

## 2024-01-31 ENCOUNTER — PROCEDURE VISIT (OUTPATIENT)
Dept: NEUROLOGY | Facility: CLINIC | Age: 19
End: 2024-01-31
Payer: COMMERCIAL

## 2024-01-31 VITALS — DIASTOLIC BLOOD PRESSURE: 88 MMHG | TEMPERATURE: 96.7 F | RESPIRATION RATE: 18 BRPM | SYSTOLIC BLOOD PRESSURE: 130 MMHG

## 2024-01-31 DIAGNOSIS — G43.009 MIGRAINE WITHOUT AURA AND WITHOUT STATUS MIGRAINOSUS, NOT INTRACTABLE: ICD-10-CM

## 2024-01-31 PROCEDURE — 96372 THER/PROPH/DIAG INJ SC/IM: CPT | Performed by: PSYCHIATRY & NEUROLOGY

## 2024-01-31 RX ORDER — KETOROLAC TROMETHAMINE 30 MG/ML
60 INJECTION, SOLUTION INTRAMUSCULAR; INTRAVENOUS ONCE
Status: COMPLETED | OUTPATIENT
Start: 2024-01-31 | End: 2024-01-31

## 2024-01-31 RX ORDER — KETOROLAC TROMETHAMINE 10 MG/1
10 TABLET, FILM COATED ORAL EVERY 6 HOURS
Qty: 20 TABLET | Refills: 0 | Status: SHIPPED | OUTPATIENT
Start: 2024-01-31 | End: 2024-02-05

## 2024-01-31 RX ADMIN — KETOROLAC TROMETHAMINE 60 MG: 30 INJECTION, SOLUTION INTRAMUSCULAR; INTRAVENOUS at 11:47

## 2024-01-31 NOTE — PROGRESS NOTES
Severe intractable migraine cycle. Home rescue treatments are not breaking cycle. Medrol dose pack has  been effective in the past. Scripted to pharmacy with approval. Instructed on use and side effects. No triptans during Medrol if can help it. States understanding

## 2024-02-02 ENCOUNTER — OFFICE VISIT (OUTPATIENT)
Dept: FAMILY MEDICINE CLINIC | Age: 19
End: 2024-02-02

## 2024-02-02 ENCOUNTER — SPECIALTY PHARMACY (OUTPATIENT)
Dept: PHARMACY | Facility: CLINIC | Age: 19
End: 2024-02-02

## 2024-02-02 VITALS
HEART RATE: 94 BPM | HEIGHT: 66 IN | TEMPERATURE: 98.2 F | WEIGHT: 226.6 LBS | SYSTOLIC BLOOD PRESSURE: 116 MMHG | BODY MASS INDEX: 36.42 KG/M2 | OXYGEN SATURATION: 97 % | DIASTOLIC BLOOD PRESSURE: 76 MMHG

## 2024-02-02 DIAGNOSIS — Z00.00 ENCOUNTER FOR WELL ADULT EXAM WITHOUT ABNORMAL FINDINGS: Primary | ICD-10-CM

## 2024-02-02 DIAGNOSIS — J98.01 BRONCHOSPASM: ICD-10-CM

## 2024-02-02 DIAGNOSIS — M45.0 ANKYLOSING SPONDYLITIS OF MULTIPLE SITES IN SPINE (HCC): ICD-10-CM

## 2024-02-02 DIAGNOSIS — M54.41 CHRONIC RIGHT-SIDED LOW BACK PAIN WITH RIGHT-SIDED SCIATICA: ICD-10-CM

## 2024-02-02 DIAGNOSIS — G89.29 CHRONIC RIGHT-SIDED LOW BACK PAIN WITH RIGHT-SIDED SCIATICA: ICD-10-CM

## 2024-02-02 PROCEDURE — RXMED WILLOW AMBULATORY MEDICATION CHARGE

## 2024-02-02 RX ORDER — ALBUTEROL SULFATE 90 UG/1
1 AEROSOL, METERED RESPIRATORY (INHALATION) EVERY 6 HOURS PRN
Qty: 18 G | Refills: 5 | Status: SHIPPED | OUTPATIENT
Start: 2024-02-02

## 2024-02-02 RX ORDER — GABAPENTIN 100 MG/1
100 CAPSULE ORAL 2 TIMES DAILY
Qty: 180 CAPSULE | Refills: 3 | Status: SHIPPED | OUTPATIENT
Start: 2024-02-02 | End: 2025-01-27

## 2024-02-02 SDOH — ECONOMIC STABILITY: HOUSING INSECURITY
IN THE LAST 12 MONTHS, WAS THERE A TIME WHEN YOU DID NOT HAVE A STEADY PLACE TO SLEEP OR SLEPT IN A SHELTER (INCLUDING NOW)?: NO

## 2024-02-02 SDOH — ECONOMIC STABILITY: FOOD INSECURITY: WITHIN THE PAST 12 MONTHS, YOU WORRIED THAT YOUR FOOD WOULD RUN OUT BEFORE YOU GOT MONEY TO BUY MORE.: NEVER TRUE

## 2024-02-02 SDOH — ECONOMIC STABILITY: FOOD INSECURITY: WITHIN THE PAST 12 MONTHS, THE FOOD YOU BOUGHT JUST DIDN'T LAST AND YOU DIDN'T HAVE MONEY TO GET MORE.: NEVER TRUE

## 2024-02-02 SDOH — ECONOMIC STABILITY: INCOME INSECURITY: HOW HARD IS IT FOR YOU TO PAY FOR THE VERY BASICS LIKE FOOD, HOUSING, MEDICAL CARE, AND HEATING?: NOT HARD AT ALL

## 2024-02-02 ASSESSMENT — PATIENT HEALTH QUESTIONNAIRE - PHQ9
SUM OF ALL RESPONSES TO PHQ QUESTIONS 1-9: 0
SUM OF ALL RESPONSES TO PHQ QUESTIONS 1-9: 0
SUM OF ALL RESPONSES TO PHQ9 QUESTIONS 1 & 2: 0
SUM OF ALL RESPONSES TO PHQ QUESTIONS 1-9: 0
SUM OF ALL RESPONSES TO PHQ QUESTIONS 1-9: 0
2. FEELING DOWN, DEPRESSED OR HOPELESS: 0
1. LITTLE INTEREST OR PLEASURE IN DOING THINGS: 0

## 2024-02-02 NOTE — PROGRESS NOTES
[facility] PRIMARY CARE  [address] [facility] [address]  Dept: [phone]  Dept Fax: [phone]     NAME: Liza Peralta        :  2005        MRN:  [id number]    Chief Complaint   Patient presents with    Annual Exam     Pain in lower back on right side radiating down right hip       Subjective     History of Present Illness  Liza Peralta is a 18 y.o. female who presents today for routine annual follow up and medication refill.     Continues to follow with several specialist through [facility].  Some records are available for review through care everywhere.  Reports that she was recently diagnosed with ankylosing spondylitis and has been having some intermittent nerve pain down her right hip.  She was started on Taltz to treat the AS but is not currently on any nerve medications        Review of Systems  Please see HPI above. Comprehensive review of systems negative unless otherwise noted above.    Past Medical Hx:  Past Medical History:   Diagnosis Date    Migraine        Past Surgical Hx:  Past Surgical History:   Procedure Laterality Date    LEG SURGERY Right     Christie    WISDOM TOOTH EXTRACTION         Family Hx:  Family History   Problem Relation Age of Onset    Other Mother     High Blood Pressure Mother     Hypothyroidism Mother     Anxiety Disorder Brother        Social Hx:  Social History     Tobacco Use    Smoking status: Never    Smokeless tobacco: Never   Substance Use Topics    Alcohol use: Yes     Comment: occasionally       Home Medications:  Current Outpatient Medications   Medication Sig Dispense Refill    Apple Cider Vinegar 188 MG CAPS Take by mouth      albuterol sulfate HFA (PROVENTIL;VENTOLIN;PROAIR) 108 (90 Base) MCG/ACT inhaler Inhale 1 puff into the lungs every 6 hours as needed for Wheezing or Shortness of Breath 18 g 5    gabapentin (NEURONTIN) 100 MG capsule Take 1 capsule by mouth 2 times daily for 360 days.

## 2024-02-06 ENCOUNTER — SPECIALTY PHARMACY (OUTPATIENT)
Dept: PHARMACY | Facility: CLINIC | Age: 19
End: 2024-02-06

## 2024-02-06 ENCOUNTER — PHARMACY VISIT (OUTPATIENT)
Dept: PHARMACY | Facility: CLINIC | Age: 19
End: 2024-02-06
Payer: MEDICAID

## 2024-02-19 ENCOUNTER — APPOINTMENT (OUTPATIENT)
Dept: RHEUMATOLOGY | Facility: CLINIC | Age: 19
End: 2024-02-19
Payer: COMMERCIAL

## 2024-02-28 NOTE — PROGRESS NOTES
No new treatment or prevention since last visit prevention is lamictal 150 and qulipta 60   Migraines are worse, Medrol 1-25-24 no help she came in for a toradol injection 1/31/24 and she stated that helped a lot. Relief lasted only 3 days.   Did have ear infection 1-17 but is better now with continued headache.   Dr Astorga switched biologic for AS from humera to Cimza which is not as helpful and is experiencing thoracic pain. Does not have anything to take for this pain., Indocin helps minimally     Experiencing migraines 15 per month  Treating migraines with rizatriptan and if that doesn't help she takes the reyvow. and relieves in about 30 minutes to an hour.  Has to repeat treatment 20% of the time    Migraine occurs in mostly the front of the head. R adrian and retro orbital   Associated symptoms are nausea, light sensitive and now sounds are starting to bother her as well  Triggers for migraines is weather changes    Sleeping 6-8 hours per night on average.  Has trouble falling asleep. Takes melatonin occasionally to help with this.   Has trouble staying asleep.   Does not feel rested on awakening  Endorses anxiety and depression, is taking Prozac to help with this. Sees [facility] at [facility] psych care    She is still missing 1-2 days of work per month due to migraines.      RECALL 7/17/23    Weaned off of Zonisamide no side effect effect was not as good. and started Lamictal.  Currently on 100mg Lamictal. Denies side effect     Experiencing good migraine prevention from Lamictal  Migraines down to 9 per month. Had been >20 per month  Intensity about the same but frequency has decreased.      Missing 1-2 days of work per month with migraine. 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      Treats with rizatriptan  or Reyvow depending on intensity. Reyvow when more severe  both work in 30-60 min. Has sumatriptan injection as well if orals don't work. Has not needed recently.  Migraine occurs behind  "right eye and bilateral occipital   Associated light sensitivity and nausea   Triggers are weather,      Continue daily Qulitpa 60mg, No side of constipation.      Sleep is \"ok\". Averages 6-7 hours  Endorses anxiety. Well controlled on Fluoxetine. No current counseling.   Denies depression.     MRI orbit    Objective   Neurological Exam    Motor  Normal muscle bulk throughout. No fasciculations present. Normal muscle tone. No abnormal involuntary movements. Strength is 5/5 throughout all four extremities.    Sensory  Sensation is intact to light touch, pinprick, vibration and proprioception in all four extremities.  Normal pp on thoacic spine no deficit.    Reflexes                                            Right                      Left  Biceps                                 3+                         3+  Patellar                                3+                         3+  Achilles                                3+                         3+    Gait  Normal casual, toe, heel and tandem gait.    Physical Exam  Neurological:      Motor: Motor strength is normal.     Deep Tendon Reflexes:      Reflex Scores:       Bicep reflexes are 3+ on the right side and 3+ on the left side.       Patellar reflexes are 3+ on the right side and 3+ on the left side.       Achilles reflexes are 3+ on the right side and 3+ on the left side.      I personally reviewed laboratory, radiographic, and medical studies which were pertinent for MPRESSION:  No significant interval change in the previously noted right optic  nerve glioma.     Similar appearance of nonspecific enhancement in the medial right  temporal lobe with adjacent cystic change and mineralization.  Appearance is not significantly changed from 06/23/2020 MRI brain  examination. Differential considerations again include indolent  neoplasm versus vascular malformation..    Assessment/Plan   Problem List Items Addressed This Visit             ICD-10-CM    Cervicalgia M54.2 "    Relevant Medications    lamoTRIgine (LaMICtal) 200 mg tablet    Other Relevant Orders    XR thoracic spine complete 4+ views     Other Visit Diagnoses         Codes    Neck pain    -  Primary M54.2    Chronic migraine without aura without status migrainosus, not intractable     G43.709    Relevant Medications    lamoTRIgine (LaMICtal) 200 mg tablet    montelukast (Singulair) 10 mg tablet    Other Relevant Orders    MR brain w and wo IV contrast    MR angio head w and wo IV contrast    Nausea     R11.0    Relevant Medications    ondansetron (Zofran) 4 mg tablet          I

## 2024-02-29 ENCOUNTER — OFFICE VISIT (OUTPATIENT)
Dept: NEUROLOGY | Facility: CLINIC | Age: 19
End: 2024-02-29
Payer: COMMERCIAL

## 2024-02-29 VITALS — TEMPERATURE: 98.1 F | SYSTOLIC BLOOD PRESSURE: 122 MMHG | RESPIRATION RATE: 16 BRPM | DIASTOLIC BLOOD PRESSURE: 88 MMHG

## 2024-02-29 DIAGNOSIS — G43.709 CHRONIC MIGRAINE WITHOUT AURA WITHOUT STATUS MIGRAINOSUS, NOT INTRACTABLE: ICD-10-CM

## 2024-02-29 DIAGNOSIS — M54.2 CERVICALGIA: ICD-10-CM

## 2024-02-29 DIAGNOSIS — R11.0 NAUSEA: ICD-10-CM

## 2024-02-29 DIAGNOSIS — M54.2 NECK PAIN: Primary | ICD-10-CM

## 2024-02-29 PROCEDURE — 96372 THER/PROPH/DIAG INJ SC/IM: CPT | Performed by: PSYCHIATRY & NEUROLOGY

## 2024-02-29 PROCEDURE — 1036F TOBACCO NON-USER: CPT | Performed by: PSYCHIATRY & NEUROLOGY

## 2024-02-29 PROCEDURE — 99215 OFFICE O/P EST HI 40 MIN: CPT | Performed by: PSYCHIATRY & NEUROLOGY

## 2024-02-29 RX ORDER — MONTELUKAST SODIUM 10 MG/1
10 TABLET ORAL NIGHTLY
Qty: 30 TABLET | Refills: 11 | Status: SHIPPED | OUTPATIENT
Start: 2024-02-29 | End: 2025-02-28

## 2024-02-29 RX ORDER — LAMOTRIGINE 200 MG/1
200 TABLET ORAL DAILY
Qty: 30 TABLET | Refills: 11 | Status: SHIPPED | OUTPATIENT
Start: 2024-02-29 | End: 2025-02-28

## 2024-02-29 RX ORDER — ONDANSETRON 4 MG/1
4 TABLET, FILM COATED ORAL EVERY 8 HOURS PRN
Qty: 20 TABLET | Refills: 0 | Status: SHIPPED | OUTPATIENT
Start: 2024-02-29 | End: 2024-03-07

## 2024-02-29 RX ORDER — KETOROLAC TROMETHAMINE 30 MG/ML
60 INJECTION, SOLUTION INTRAMUSCULAR; INTRAVENOUS ONCE
Status: COMPLETED | OUTPATIENT
Start: 2024-02-29 | End: 2024-02-29

## 2024-02-29 RX ORDER — KETOROLAC TROMETHAMINE 10 MG/1
10 TABLET, FILM COATED ORAL EVERY 6 HOURS PRN
Qty: 20 TABLET | Refills: 0 | Status: SHIPPED | OUTPATIENT
Start: 2024-02-29 | End: 2024-03-05

## 2024-02-29 RX ADMIN — KETOROLAC TROMETHAMINE 60 MG: 30 INJECTION, SOLUTION INTRAMUSCULAR; INTRAVENOUS at 09:20

## 2024-02-29 NOTE — PATIENT INSTRUCTIONS
Today because of your new problem of tinnitus ( ocean sound) I am ordering an MRI and MRA of the brain  Also I am ordering an Xray of your thoracic spine because of the new pain that has developed there.   I am also going to increase your lamictal to 200 because of the worsening of your headaches.  I am adding singulair because of your recurrent ear infections. I am hoping this manages the allergies enough to stop the recurrence.  We added ondansetron for your nausea.   You received Toradol today for your severe headache.  We are going to continue with 5 day of pills starting tomorrow to break your headache cycle.  Take 1 pill with breakfast lunch dinner and bedtime for 5 days.  Take with food.  Try not to take your triptan or antiinflammatory during this time.  If you have any nausea or diarrhea with the medicine stop and call on the next business day. Hold indocin while on the toradol  Follow up in 6 weeks.

## 2024-03-01 ENCOUNTER — SPECIALTY PHARMACY (OUTPATIENT)
Dept: PHARMACY | Facility: CLINIC | Age: 19
End: 2024-03-01

## 2024-03-01 PROCEDURE — RXMED WILLOW AMBULATORY MEDICATION CHARGE

## 2024-03-06 ENCOUNTER — PHARMACY VISIT (OUTPATIENT)
Dept: PHARMACY | Facility: CLINIC | Age: 19
End: 2024-03-06
Payer: MEDICAID

## 2024-03-12 ENCOUNTER — OFFICE VISIT (OUTPATIENT)
Dept: ENDOCRINOLOGY | Facility: CLINIC | Age: 19
End: 2024-03-12
Payer: COMMERCIAL

## 2024-03-12 VITALS
SYSTOLIC BLOOD PRESSURE: 144 MMHG | HEART RATE: 74 BPM | WEIGHT: 230 LBS | HEIGHT: 66 IN | DIASTOLIC BLOOD PRESSURE: 97 MMHG | BODY MASS INDEX: 36.96 KG/M2

## 2024-03-12 DIAGNOSIS — C72.30 GLIOMA OF OPTIC NERVE (MULTI): Primary | ICD-10-CM

## 2024-03-12 DIAGNOSIS — R53.83 OTHER FATIGUE: ICD-10-CM

## 2024-03-12 DIAGNOSIS — G93.9 BRAIN LESION: ICD-10-CM

## 2024-03-12 PROCEDURE — 1036F TOBACCO NON-USER: CPT | Performed by: INTERNAL MEDICINE

## 2024-03-12 PROCEDURE — 99214 OFFICE O/P EST MOD 30 MIN: CPT | Performed by: INTERNAL MEDICINE

## 2024-03-12 ASSESSMENT — PAIN SCALES - GENERAL: PAINLEVEL: 2

## 2024-03-12 NOTE — PROGRESS NOTES
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     Pertinent Hx:  -Pt began to experience headaches in November 2018 and began seeing pediatric neurology. She was refractory to multiple medications over the years, therefore the decision was made to undergo imaging.     Brain MRI was completed 6/19/20 and revealed a heterogeneous T2 hypointense signal associated with vasogenic edema in the right temporal lobe suggestive of possible cavernous malformation (no contrast given). A follow up MRI brain with contrast and MRA were performed to further characterize this lesion. This was completed on 6/25/2020 and revealed central enhancement of the right temporal lobe lesion. In addition, thickening of the pre-chiasmatic portion of the right optic nerve was identified with no associated enhancement. MRA was normal. Last MRI 8/2023 showed that the optic nerve glioma was unchanged from prior. Plan per neuro-onc (Dr. Ferdinand Fuentes 8/16/23) was for continued annual MRIs and ophthalmology evaluation     Pt has no history of neurocutaneous disorder. She has two cafe au lait macules (back and arm) and mother reports no known inguinal or axillary freckling. She has no history of neurofibromas. There is no family history of neurofibromatosis. NF 1 genetic testing was completed in the past and negative.      Pt is currently undergoing continued yearly imaging monitoring of these lesions with pediatric hematology/oncology and neurology      She was seen by Dr. Shonda Martinez/ophthalmology on 11/24/2020 and found to have normal corrected VA, normal appearance to her fundi, normal visual fields. OCT  was performed and revealed normal retinal nerve fiber thickness with subtle decrease when comparing right (slightly decreased) to left but both normal. She has since been evaluated by another ophthalmologist and is overdue for appointment.     In January 2023, patient saw OBGYN (records not available) and prolactin level was checked due to the patient telling her OBGYN that she had a mass near the pituitary. Prolactin was found to be elevated (31, records not available for confirmation). Recheck in March 2023 showed that it continued to be elevated (45.5, 3/8/23). Upon seeing endocrinology, her prolactin level had normalized to 14.3 (8/2023)     Last endocrine visit 9/12/23: Elevated prolactin levels had normalized, likely has PCOS, already on OCP through OBGYN. For insulin resistance, started on metformin. For vitamin D deficiency, started on vitamin D 5000IU daily, and for c/f JULIETTE she was referred for sleep study which resulted without any abnormalities.     Current regimen:  -Metformin 1000mg BID  -Vitamin D 5000IU daily  -OCP     Today:  -Patient reports that overall things are going okay. She is still having pretty significant HA however which have worsened since last appt. She has seen neurology and just finished a steroid taper for the HA. She is scheduled for MRI and MRA at the end of the month.  -Medication changes from last appointment include Humira to Taltz for HA. She also reports that her OCP was changed from Slynd to Beyaz (which has an estrogen component) as she was having frequent periods on the Slynd. This change was just made at the end of January/beginning of February. She has had one period since then which started on 3/2 and ended 3/6. She reports it was a bit lighter than usual. No spotting since starting this birth control pill. Reports worsened HA preceded the change in OCP.   -Taking metformin: Yes, taking 1000mg BID. Tolerates well when she takes with food. Will occasionally miss doses if  she does not eat.    -Taking vitamin D: Yes, taking it from multiple sources--vitamin D pill, vitamin D/calcium combo pill, and in her multivitamin. Believes it adds up to about 5000IU daily. Denies missed doses  -Recent steroid exposure: Medrol dose pack for HA   -OTC medications: Collagen, vitamin C, vitamin D, vitamin D/calcium, multivitamin  -Current medications include calcium 500mg daily, fish oil 1000mg daily, claritin 10mg daily, fluoxetine 10mg daily, Taltz, indomethacin 75mg daily, lamotrigine 100mg daily, melatonin 5mg QHS, Quilpta 60mg daily, Reyvow PRN, Rizatriptan PRN, Beyaz OCP, sucralafate 1gm daily, zofran PRN    ROS:  -HA: Endorses chronic severe migraines for which she is prescribed many daily medications and PRN medications for abortive therapy. Follows with HA specialist  -Vision changes: Denies issues with peripheral vision. Has some occasional blurry vision.   -Galactorrhea: Denies  -Menstrual cycles: Reports periods have always been irregular since they began. She will often miss 2-3 months at a time. They are heavy when they come. Has been on a variety of different birth control tablets. Recently switched to another OCP as above.   -Fractures: Denies  -Polyuria: Denies  -Sleep: Sleeps okay, 6-8 hrs per night. Pt lightly snores.   -Energy: Poor, wakes up feeling tired. Best time of day for her is late afternoon  -Appetite: Good  -Change in weight: Lost about 7 lbs since her last visit with us, no change in diet. Has been walking more around campus.  -GERD: Denies   -CP: Occasionally, monitored by cardiology, thinks it is likely costochondritis  -Bruising: Endorsing bruising a bit more easily now compared to prior   -SOB: Denies    -Palpitations: Denies  -Diarrhea/Constipation: Denies  -Tremors: Denies  -Muscle cramps: Denies   -Muscle weakness: Denies  -Hot flashes: Denies  -Night sweats: Denies  -Excess body hair: Denies  -Skin changes: Endorses facial acne, denies any body acne. Has some  stretch marks over lower abdomen.   -Nausea/vomiting: Some nausea when she has HA and some occasional nausea at random times. Has zofran PRN. Last dose was ~4 days ago  -Abdominal Pain: Denies     10 point ROS neg unless stated above     PMH: as above    Allergies: Amoxicillin    Family Hx:   -Hashimoto's (mother)  -DLD (maternal and paternal side)  -HTN (maternal and paternal side)  -Ankylosing spondylitis (maternal aunt)  -T2DM (Maternal grandparents)   -Sjogrens    Social hx:  -Alcohol: Occasional  -Tobacco: Denies  -Illicits: Denies   -Works part time at [employer], in school at [employer] studying early childhood education (finishing her [employer] year)    Current Outpatient Medications   Medication Instructions    azelastine (Astelin) 137 mcg (0.1 %) nasal spray 1 spray, nasal    certolizumab pegol (Cimzia) injection Inject 2 syringe (400mg) under the skin at week 0, 2, and 4 and then every 4 weeks.    cholecalciferol (Vitamin D-3) 50 MCG (2000 UT) tablet oral    fish oil concentrate (Omega-3) 120-180 mg capsule oral, Daily RT    FLUoxetine (PROzac) 10 mg tablet 2 tablets (20 mg).    hydrocortisone 2.5 % ointment Topical, 2 times daily    indomethacin SR (Indocin SR) 75 mg ER capsule TAKE 1 CAPSULE BY MOUTH ONCE DAILY WITH A MEAL    ixekizumab (Taltz Autoinjector) 80 mg/mL injection Inject 80mg (1 pen) under the skin every 28 (twenty-eight) days.    lamoTRIgine (LAMICTAL) 200 mg, oral, Daily    loratadine 10 mg capsule oral    melatonin 3 mg tablet 2 tablets, oral, Nightly    metFORMIN (OSM) (FORTAMET) 1,000 mg, oral, 2 times daily with meals, Do not crush, chew, or split.    montelukast (SINGULAIR) 10 mg, oral, Nightly    multivitamin with minerals (multivitamin-iron-folic acid) tablet oral    Qulipta 60 mg, oral, Daily    Reyvow 100 mg tablet TAKE 1 TO 2 TABLETS BY MOUTH ONCE DAILY AS NEEDED FOR HEADACHE. TO LAST 30 DAYS    rizatriptan (Maxalt) 10 mg tablet TAKE ONE TABLET BY MOUTH AT ONSET OF HEADACHE, MAY  "REPEAT EVERY 2 HOURS AS NEEDED, MAXIMUM OF 3 TABLET PER 24 HOURS    Slynd 4 mg (28) tablet 1 tablet, oral, Daily    sucralfate (Carafate) 1 gram tablet TAKE 1 TABLET BY MOUTH WITH INDOCIN    SUMAtriptan (Imitrex) 25 mg tablet oral    SUMAtriptan (Imitrex) 6 mg/0.5 mL injection subcutaneous    Ventolin HFA 90 mcg/actuation inhaler 2 puffs, inhalation, Every 6 hours PRN      O:  VS: BP (!) 144/97   Pulse 74   Ht 1.676 m (5' 6\")   Wt 104 kg (230 lb)   BMI 37.12 kg/m²      PE:  Constitutional: NAD, well groomed. A/Ox3. Cooperative  Skin/Hair: Warm, dry skin. Early acanthosis noted over posterior neck and b/l knuckles   HEENT: CHANDA, EOMI, Anicteric scleras. Moist oral mucosa. Neg Chvostek sign. Fundoscopic exam normal (8/2023)  Neck: Soft, supple. Non tender, full ROM, no lymphadenopathy. Thyroid gland palpation: 20gm, smooth, nontender  Cardiovascular: RRR, no rub or murmurs.  Respiratory: CTAB, no accessory muscle use.  Abdomen: Soft, nontender to palpation, +BS, obese, no striae  Extremities: No peripheral edema.  Neuro: Moving all extremities spontaneously. CN II-XII grossly intact. No balance or gait disturbances. Strength 5/5 throughout    LABS:    8/16/23:  Insulin resistance labs:   C-peptide 28  Insulin 15  Glucose 84  MARY-IR 3.1  HbA1C 4.6%     Pituitary labs:   DHEA-S 146  ACTH 20.8  Cortisol 11.3  Prolactin 14.3  IGF-1 209  TSH 2.49  FT4 1.27  FSH 6.3  LH 11.9  Estradiol 34  Free T 5.1  Total T 41  SHBG 34.5     17 hydroxyprogesterone 25.23  Androstenedione 1.508     CMP (8/16/23) Glu 84, Na 143, K 4.2, Cl 104, bicarb 27, BUN 12, Cr 0.94, Ca 9.9, alb 4.8, alk phos 49, total protein 7.1, total bili 1.7, ALT 25, AST 22, CoCa 9.3         Prolactin 31 (January 2023, no records available to confirm, per patient report)  Prolactin 45.5 (3/8/23)     Lipid panel: Chol 183, HDL 45, , TG 92 (9/19/22)     10/17/20:  CBC: WBC 9.3, Hgb 14.8, plt 301  CMP: Na 140, K 3.8, Cl 106, bicarb 20, Ca 9.2, total " protein 6.8, alb 4.3, tbili 0.7, alk phos 76, AST 16, ALT 27  HbA1C: 5.1%  Lipid panel: Chol 161, HDL 34, , TG 94  TSH: 2.22  FT4 1.23   Vit D 30.4     3/28/19:  TSH: 2.633   FT4: 1.2  CBC: WBC 7.2, Hgb 14.8, plt 269  CMP: Glu 98, Na 137, K 3.9, Cl 104, bicarb 21.9, BUN 10, Cr 0.74, tbili 0.9, AST 15, ALT 16, alk phos 67, Ca 9.1, total protein 6.5, alb 4.1     SLEEP STUDY        Narrative  [facility]  RECORDTYPE: PSG  CPT: 79370 PSG (>=6y)  CSN: [id number]  SCHRECDATE: 10/12/2023 19:55:11  LOCATION_CODE: [id number]  PROCDUR: 515.5 min    Sleep Medicine  at [place] [address]  [phone]    Diagnostic Polysomnography Report    Patient: BLAINE HONG          MRN: [id number]                 : 2005  Study Date: 10/12/2023            Height: 167.6 cm              Age: 18  Study Type: PSG; 43803 PSG (>=6y) Weight: 106.1 kg  BMI: 37.8 Sex: Female  Referring Clinician: BRODIE HARVEY  Neck size: 38.0 cm            ESS:     DIAGNOSIS: Sleep Disorder, Unspecified (G47.9)    CMS: no  CLINICAL SUMMARY  Indication: Patient referred for fatigue, excessive daytime sleepiness,  difficulty staying asleep and falling asleep  Past Medical History: glioma of optic nerve, insulin resistance, vitamin D  deficiency, nonradiographic spondyloarthritis, asthma, migraines,  anxiety/depression, obesity, dyslipidemia, seasonal allergies  Medications: Slynd, Quilipta, Indomethacin, Claritin, Lamotrigine,  Fluoxetine, Melatonin, Vitamin D, Calcium, Metformin, Humalog,  Methylprednisolone    TECHNICAL OBSERVATIONS / BEHAVIOR SUMMARY  [facility] 18 year old patient came to [facility] sleep  lab for a PSG. Hookup and procedure was explained to patient. Mild  intermittent snoring heard. Mouth breathing was absent. REM supine sleep  captured. No parasomnias. REM sleep with appropriate atonia noted.    EEG / SLEEP SUMMARY  The  nocturnal sleep study demonstrated acceptable sleep onset and reduced REM  sleep latency, total sleep time was 461.0 minutes, and the sleep efficiency  was 89.4%. The limited sleep EEG montage demonstrated appropriate waveforms  without epileptiform discharges.  PERIODIC LIMB MOVEMENT SUMMARY  No significant periodic limb movements of sleep were noted during this  diagnostic study.  The periodic limb movement index during sleep was 0.0/hr,  with 0.0% associated with arousals. The periodic limb movement arousal index  during sleep was 0.0/hr.  RESPIRATORY SUMMARY  This study was started and performed on room air. Supplemental oxygen was not  given. The RDI3% was 3.5 events per hour, RDI4% was 0.0/hr and LIUDMILA was  0.0/hr.  During sleep, based on RDI3%, the breathing pattern demonstrated no  significant sleep disordered breathing.  The mean oxygen saturation was 98.0%  during wake and 97.0% during sleep. The oxygen saturation was <= 88% for 0.1  minutes. The SpO2 daina was 95.0%.        CO2 analysis: N/A  CARDIAC SUMMARY  The mean heart rate during wake was 80 bpm and during sleep was 71 bpm. The  cardiac rhythm demonstrated normal sinus rhythm.    IMPRESSION  Based on the AASM recommended definition, no significant sleep-related  breathing disorder is observed in this study. The Sp02 daina was 95.0%.  Based on the CMS definition, no significant sleep-related breathing disorder  is observed in this study.  The Sp02 daina was 95.0%.  No significant periodic limb movements of sleep were noted.  Fragmentation of sleep noted during this study appeared to be due to frequent  spontaneous arousals  RECOMMENDATIONS  There is no obvious need for a PAP titration study or intervention at this  time.  Review and advise on habits of sleep duration, regularity, timing, and  environment for sleep health.  Adequate sleep hygiene (good sleep habits) should be emphasized.  Safety management: Avoid driving vehicle or operating heavy  machinery when  sleepy.             IMAGING:  MRI orbits w/wo contrast (8/16/23)      FINAL REPORT  Interpreted by: ASHLEY GRAHAM and NURYS LEMOS ROBERT, MD  08/16/23 17:01  MRN: [id number]  Patient Name: BLAINE HONG     STUDY:  MR ORBITS WO/W; 8/16/2023 12:44 pm     INDICATION:  16yo without NF1, OPG by scans. Routine interval surveillance to  monitor for imaging progression C72.30: Low-grade glioma of optic  nerve Q28.3: Cerebral cavernous malformation.     COMPARISON:  MRI orbits dated 08/31/2022     ACCESSION NUMBER(S):  [id number]     ORDERING CLINICIAN:  RICCARDO CARABALLO     TECHNIQUE:  Standard multiplanar multisequence MR imaging was performed through  the orbits prior to and following administration of 20 mL of Dotarem  intravenous contrast.     FINDINGS:  ORBITS:     Similar to prior examination, there is thickening of the cisternal  portion of the right optic nerve and right optic chiasm without  enhancement, consistent with previously noted low-grade optic glioma  (series 8 images 11 and 10, for example). The left optic nerve and  optic chiasm are unremarkable. The globes are intact. Extraocular  muscles and retrobulbar fat appear normal.        VISUALIZED BRAIN:     The cerebellar tonsils are low-lying at approximately 0.6 cm below  the foramen magnum.     There is similar appearance of nonspecific enhancement within the  medial right temporal lobe (series 9, image 13) with T2 hypointensity  suggestive of mineralization (series 6, image 14) and surrounding  cystic change. There is no interval mass effect.     The ventricular size is normal.     The flow voids of the major intracranial vessels appear intact.     The bones and extracranial soft tissues are unremarkable.     Mild mucosal thickening in the ethmoid air cells. Otherwise,  visualized paranasal sinuses and mastoid air cells are clear.     IMPRESSION:  No significant interval change in the previously noted right optic  nerve glioma.      Similar appearance of nonspecific enhancement in the medial right  temporal lobe with adjacent cystic change and mineralization.  Appearance is not significantly changed from 06/23/2020 MRI brain  examination. Differential considerations again include indolent  neoplasm versus vascular malformation.     I personally reviewed the images/study and I agree with the resident  findings as stated. This study was interpreted at [facility], [place], [place].     Electronically signed by: GLADYS 08/16/23 17:01           MRI orbits w/wo contrast (8/31/22)      MRI Orbits w/wo Contrast Final     No Documents Attached       FINAL REPORT  Interpreted by: KARISSA MADERA FREDERICK, MD  09/01/22 08:06  MRN: [id number]  Patient Name: BLAINE HONG     STUDY:  MR ORBITS WO/W; 8/31/2022 5:20 pm     INDICATION:  18yo without NF1, history of OPG by imaging. Routine interval scan  to monitor for growth. C72.30: Low-grade glioma of optic nerve  Q28.3: Cerebral cavernous malformation.     COMPARISON:  August 2020.     ACCESSION NUMBER(S):  [id number]     ORDERING CLINICIAN:  RICCARDO CARABALLO     TECHNIQUE:  The brain and optic nerves were studied in the sagittal, axial and  coronal planes utilizing T1 and T2 weighted images both before and  following intravenous injection of 20 cc Dotarem     FINDINGS:  * Unchanged thickening of the cisternal portion of the right optic  nerve and right optic chiasm without abnormal enhancement. Finding  consistent with low-grade optic glioma  *The left optic nerve and optic chiasm are normal in size and signal.  *Unchanged subcentimeter enhancing nodule in the medial portion of  the right temporal lobe. This is a nonspecific finding. Primary  neoplasm not excluded. Also consider slow flow vascular malformation.  *The previously demonstrated abnormal enhancement in the left  thalamus is not identified     IMPRESSION:  * No change in the size or appearance of the right  optic nerve glioma  *Unchanged enhancing nodule in the right medial temporal lobe  *Nonvisualization of the previous left thalamic focus  * [place] [facility] [place]ronically signed by: KARISSA MADERA 09/01/22 08:06     MRI brain w/wo contrast and MRI orbits w/wo contrast (2/10/21)     INDICATION:  Malignant neoplasm of unspecified optic nerve Other malformations of  cerebral vessels.     COMPARISON:  MRI of brain from 08/12/2020     TECHNIQUE:  Diffusion, T2, FLAIR, and T1 weighted MR images of the brain were  obtained. High resolution coronal T1 and STIR images of the orbits  were obtained. Following administration of 9 cc Gadavist gadolinium  based intravenous contrast, post contrast T1 images of the brain and  high resolution fat-suppressed axial and coronal T1 images of the  orbits were acquired.     FINDINGS:  There is no diffusion restriction abnormality to suggest acute  infarct.     Again seen is mildly expansile heterogenous FLAIR hyperintense signal  within the medial and anterior aspect of the right temporal lobe,  similar in distribution to prior study. There is focal postcontrast  enhancement within this region which is similar to prior study. Again  the curvilinear configuration makes accurate measurement difficult,  however the overall appearance is similar to prior exam.     A small focus of increased T2 and FLAIR signal is again noted within  the left subthalamic region best seen on coronal STIR image 1/30,  similar to prior study.     There is again enlargement of the cisternal segment of the  right-sided optic nerve and thickening of the optic chiasm more  prominent to the right of midline. There is no evidence of  significant enhancement in this region. The overall appearance of the  optic nerves and the chiasm is similar to prior study while allowing  for differences in slice angulation. The pituitary infundibulum is  again mildly deviated to the left of midline. The  pituitary gland  itself is unremarkable.     There is no new parenchymal signal abnormality.     Visualized paranasal sinuses and bilateral mastoids are clear.        IMPRESSION:  Stable signal abnormality and enhancement within the right  anteromedial temporal lobe.     Stable small enhancing focus within the left subthalamic region.     Essentially stable expansion of the right pre chiasmatic optic nerve  as well as the optic chiasm while allowing for differences in slice  angulation.     The study was interpreted at [facility].  2. Unremarkable MRI of the orbits.     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     #Elevated prolactin   ::31 in January 2023 and 45.5 in March 2023  ::Clinically asymptomatic, prolactin levels now normalized on 8/16/23 lab draw (14.3) and labs also ruled out CAH and androgen secreting tumor  ::Most likely diagnosis is PCOS which accounts for fluctuating prolactin levels. Patient currently only fulfills one criteria (oligomenorrhea); she does not have hyperandrogenic symptoms and it is unknown if she has polycystic ovaries. Regardless, will continue to treat with metformin as below to assist with insulin resistance. She is already on birth control through her OBGYN.   -Hold off on repeat today     #Insulin resistance  ::Given obesity, family history of diabetes, and early acanthosis on exam  ::MARY IR 3.1 on labs from 8/16/23, suggestive of significant insulin resistance  -c/w metformin 1000mg BID, advised to take with food to help avoid GI side  effects   -Advised on lifestyle changes to promote weight loss      #Vitamin D deficiency  ::Positive chovstek's, CoCa 9.3  -c/w vitamin D 5000IU daily through multiple sources     RTC in 9 mo     Patient seen, examined, and discussed with Dr. Figueredo who agrees with the management plan.

## 2024-03-19 ENCOUNTER — OFFICE VISIT (OUTPATIENT)
Dept: FAMILY MEDICINE CLINIC | Age: 19
End: 2024-03-19

## 2024-03-19 VITALS
DIASTOLIC BLOOD PRESSURE: 80 MMHG | WEIGHT: 231 LBS | RESPIRATION RATE: 16 BRPM | BODY MASS INDEX: 37.12 KG/M2 | HEIGHT: 66 IN | SYSTOLIC BLOOD PRESSURE: 122 MMHG | OXYGEN SATURATION: 96 % | TEMPERATURE: 97.1 F | HEART RATE: 84 BPM

## 2024-03-19 DIAGNOSIS — R51.9 ACUTE NONINTRACTABLE HEADACHE, UNSPECIFIED HEADACHE TYPE: Primary | ICD-10-CM

## 2024-03-19 DIAGNOSIS — R11.0 NAUSEA: ICD-10-CM

## 2024-03-19 PROCEDURE — 99213 OFFICE O/P EST LOW 20 MIN: CPT | Performed by: NURSE PRACTITIONER

## 2024-03-19 PROCEDURE — 96372 THER/PROPH/DIAG INJ SC/IM: CPT | Performed by: NURSE PRACTITIONER

## 2024-03-19 RX ORDER — ONDANSETRON 4 MG/1
TABLET, FILM COATED ORAL
COMMUNITY
Start: 2024-02-29

## 2024-03-19 RX ORDER — FLUCONAZOLE 150 MG/1
150 TABLET ORAL DAILY
COMMUNITY
Start: 2024-02-01

## 2024-03-19 RX ORDER — LAMOTRIGINE 150 MG/1
150 TABLET ORAL DAILY
COMMUNITY
Start: 2024-02-15

## 2024-03-19 RX ORDER — PROMETHAZINE HYDROCHLORIDE 25 MG/ML
12.5 INJECTION, SOLUTION INTRAMUSCULAR; INTRAVENOUS ONCE
Status: COMPLETED | OUTPATIENT
Start: 2024-03-19 | End: 2024-03-19

## 2024-03-19 RX ORDER — DEXAMETHASONE SODIUM PHOSPHATE 10 MG/ML
10 INJECTION INTRAMUSCULAR; INTRAVENOUS ONCE
Status: COMPLETED | OUTPATIENT
Start: 2024-03-19 | End: 2024-03-19

## 2024-03-19 RX ORDER — FLUOXETINE HYDROCHLORIDE 20 MG/1
20 CAPSULE ORAL DAILY
COMMUNITY
Start: 2024-03-07

## 2024-03-19 RX ORDER — KETOROLAC TROMETHAMINE 30 MG/ML
60 INJECTION, SOLUTION INTRAMUSCULAR; INTRAVENOUS ONCE
Status: COMPLETED | OUTPATIENT
Start: 2024-03-19 | End: 2024-03-19

## 2024-03-19 RX ORDER — MONTELUKAST SODIUM 10 MG/1
10 TABLET ORAL NIGHTLY
COMMUNITY
Start: 2024-02-29 | End: 2025-02-28

## 2024-03-19 RX ORDER — KETOROLAC TROMETHAMINE 10 MG/1
TABLET, FILM COATED ORAL
COMMUNITY
Start: 2024-02-29

## 2024-03-19 RX ORDER — LAMOTRIGINE 200 MG/1
200 TABLET ORAL DAILY
COMMUNITY
Start: 2024-02-29

## 2024-03-19 RX ORDER — DROSPIRENONE, ETHINYL ESTRADIOL AND LEVOMEFOLATE CALCIUM AND LEVOMEFOLATE CALCIUM 3-0.02(24)
1 KIT ORAL DAILY
COMMUNITY
Start: 2024-02-03

## 2024-03-19 RX ADMIN — PROMETHAZINE HYDROCHLORIDE 12.5 MG: 25 INJECTION, SOLUTION INTRAMUSCULAR; INTRAVENOUS at 17:09

## 2024-03-19 RX ADMIN — KETOROLAC TROMETHAMINE 60 MG: 30 INJECTION, SOLUTION INTRAMUSCULAR; INTRAVENOUS at 17:08

## 2024-03-19 RX ADMIN — DEXAMETHASONE SODIUM PHOSPHATE 10 MG: 10 INJECTION INTRAMUSCULAR; INTRAVENOUS at 17:07

## 2024-03-19 NOTE — PROGRESS NOTES
3/19/24  Liza Peralta : 2005 Sex: female  Age 19 y.o.    Subjective:  Chief Complaint   Patient presents with    Migraine     Had for 2 days       HPI:   Liza Peralta , 19 y.o. female presents to the clinic for evaluation of right sided headache x 2 days. The patient also reports intermittent nausea, photophobia. The patient denies headache was precipitated by anything. The patient has taken Imitrex for symptoms which helps. The patient reports unchanged symptoms over time. The patient reports having headaches like this in the past. Pt denies use of a blood thinner, sudden severe onset, fever, neck pain, head injury, dizziness, vision changes, and extremity weakness / paresthesia. The patient also denies carbon monoxide exposure, CP, SOB, sinus congestion, vomiting.     ROS:   Unless otherwise stated in this report the patient's positive and negative responses for review of systems for constitutional, eyes, ENT, cardiovascular, respiratory, gastrointestinal, neurological, , musculoskeletal, and integument systems and related systems to the presenting problem are either stated in the history of present illness or were not pertinent or were negative for the symptoms and/or complaints related to the presenting medical problem.  Positives and pertinent negatives as per HPI.  All others reviewed and are negative.      PMH:     Past Medical History:   Diagnosis Date    Migraine        Past Surgical History:   Procedure Laterality Date    LEG SURGERY Right     Christie    WISDOM TOOTH EXTRACTION         Family History   Problem Relation Age of Onset    Other Mother     High Blood Pressure Mother     Hypothyroidism Mother     Anxiety Disorder Brother        Medications:     Current Outpatient Medications:     Drospiren-Eth Estrad-Levomefol 3-0.02-0.451 MG TABS, Take 1 tablet by mouth daily, Disp: , Rfl:     fluconazole (DIFLUCAN) 150 MG tablet, Take 1 tablet by mouth daily, Disp: , Rfl:     ketorolac (TORADOL)

## 2024-03-20 ENCOUNTER — HOSPITAL ENCOUNTER (OUTPATIENT)
Dept: RADIOLOGY | Facility: HOSPITAL | Age: 19
Discharge: HOME | End: 2024-03-20
Payer: COMMERCIAL

## 2024-03-20 DIAGNOSIS — M54.2 CERVICALGIA: ICD-10-CM

## 2024-03-20 PROCEDURE — 72072 X-RAY EXAM THORAC SPINE 3VWS: CPT | Performed by: RADIOLOGY

## 2024-03-20 PROCEDURE — 72072 X-RAY EXAM THORAC SPINE 3VWS: CPT

## 2024-03-21 ENCOUNTER — TELEPHONE (OUTPATIENT)
Dept: NEUROLOGY | Facility: CLINIC | Age: 19
End: 2024-03-21
Payer: COMMERCIAL

## 2024-03-21 DIAGNOSIS — G43.709 CHRONIC MIGRAINE WITHOUT AURA WITHOUT STATUS MIGRAINOSUS, NOT INTRACTABLE: ICD-10-CM

## 2024-03-21 RX ORDER — METHYLPREDNISOLONE 4 MG/1
TABLET ORAL
Qty: 21 TABLET | Refills: 0 | Status: SHIPPED | OUTPATIENT
Start: 2024-03-21 | End: 2024-03-28

## 2024-03-21 NOTE — TELEPHONE ENCOUNTER
Intractable migraine. Visited ER for toradol injection 2 days ago. Minimal relief that didn't last.   Intractable migraine headaches. Home abortives are not breaking the cycle. Medrol dose jorden Ordered per MD protocol  Pt instr on use and side effect. No other rescue meds during Medrol to break any other overuse cycle. Pt states understanding.

## 2024-03-27 ENCOUNTER — OFFICE VISIT (OUTPATIENT)
Dept: RHEUMATOLOGY | Facility: CLINIC | Age: 19
End: 2024-03-27
Payer: COMMERCIAL

## 2024-03-27 ENCOUNTER — SPECIALTY PHARMACY (OUTPATIENT)
Dept: PHARMACY | Facility: CLINIC | Age: 19
End: 2024-03-27

## 2024-03-27 VITALS — DIASTOLIC BLOOD PRESSURE: 84 MMHG | SYSTOLIC BLOOD PRESSURE: 128 MMHG | BODY MASS INDEX: 37.28 KG/M2 | WEIGHT: 231 LBS

## 2024-03-27 DIAGNOSIS — G89.29 CHRONIC LOW BACK PAIN WITHOUT SCIATICA, UNSPECIFIED BACK PAIN LATERALITY: Primary | ICD-10-CM

## 2024-03-27 DIAGNOSIS — M54.50 CHRONIC LOW BACK PAIN WITHOUT SCIATICA, UNSPECIFIED BACK PAIN LATERALITY: Primary | ICD-10-CM

## 2024-03-27 DIAGNOSIS — M45.A0 NON-RADIOGRAPHIC AXIAL SPONDYLOARTHRITIS (MULTI): ICD-10-CM

## 2024-03-27 PROCEDURE — RXMED WILLOW AMBULATORY MEDICATION CHARGE

## 2024-03-27 PROCEDURE — 99214 OFFICE O/P EST MOD 30 MIN: CPT | Performed by: INTERNAL MEDICINE

## 2024-03-27 RX ORDER — ONDANSETRON 4 MG/1
8 TABLET, FILM COATED ORAL EVERY 8 HOURS PRN
COMMUNITY

## 2024-03-27 NOTE — PROGRESS NOTES
"Recheck  Non-Radiographic Spondy.  C/O increased pain in upper back  x 2 weeks after starting Taltz.     HPI - she says that her pain has gotten worse with the taltz x3.  \"I feel like I can't stretch my back out as much no matter what I do\"   She walks, and this makes her \"hips\" and low back hurt more.  Sitting and lying down help her pain.  Her knees sometimes hurt.   No swelling. AM stiffness 30-45 min.  +CP - saw cardiol who said it was \"inflammation of the cartilage\"  No resp or GI.  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  Her pain is likely multifactorial    "

## 2024-03-29 ENCOUNTER — HOSPITAL ENCOUNTER (OUTPATIENT)
Dept: RADIOLOGY | Facility: HOSPITAL | Age: 19
Discharge: HOME | End: 2024-03-29
Payer: COMMERCIAL

## 2024-03-29 DIAGNOSIS — G43.709 CHRONIC MIGRAINE WITHOUT AURA WITHOUT STATUS MIGRAINOSUS, NOT INTRACTABLE: ICD-10-CM

## 2024-03-29 PROCEDURE — 70553 MRI BRAIN STEM W/O & W/DYE: CPT

## 2024-03-29 PROCEDURE — A9575 INJ GADOTERATE MEGLUMI 0.1ML: HCPCS | Performed by: PSYCHIATRY & NEUROLOGY

## 2024-03-29 PROCEDURE — 70553 MRI BRAIN STEM W/O & W/DYE: CPT | Performed by: RADIOLOGY

## 2024-03-29 PROCEDURE — 2550000001 HC RX 255 CONTRASTS: Performed by: PSYCHIATRY & NEUROLOGY

## 2024-03-29 RX ORDER — GADOTERATE MEGLUMINE 376.9 MG/ML
20 INJECTION INTRAVENOUS
Status: COMPLETED | OUTPATIENT
Start: 2024-03-29 | End: 2024-03-29

## 2024-03-29 RX ADMIN — GADOTERATE MEGLUMINE 20 ML: 376.9 INJECTION INTRAVENOUS at 16:28

## 2024-03-30 NOTE — RESULT ENCOUNTER NOTE
Unchanged discuss with opthomologist and oncologist. Could not find who this was to forward report to can you ask

## 2024-04-02 ENCOUNTER — PHARMACY VISIT (OUTPATIENT)
Dept: PHARMACY | Facility: CLINIC | Age: 19
End: 2024-04-02
Payer: MEDICAID

## 2024-04-03 ENCOUNTER — HOSPITAL ENCOUNTER (OUTPATIENT)
Dept: RADIOLOGY | Facility: HOSPITAL | Age: 19
Discharge: HOME | End: 2024-04-03
Payer: COMMERCIAL

## 2024-04-03 PROCEDURE — 70544 MR ANGIOGRAPHY HEAD W/O DYE: CPT

## 2024-04-03 PROCEDURE — 70544 MR ANGIOGRAPHY HEAD W/O DYE: CPT | Performed by: STUDENT IN AN ORGANIZED HEALTH CARE EDUCATION/TRAINING PROGRAM

## 2024-04-15 ENCOUNTER — SPECIALTY PHARMACY (OUTPATIENT)
Dept: PHARMACY | Facility: CLINIC | Age: 19
End: 2024-04-15

## 2024-04-15 NOTE — PROGRESS NOTES
[facility] Clinical Note    Radha Pereira is a 19 y.o. female, who is on the specialty pharmacy service of: Rheumatology Core.  Radha Pereira is taking: Taltz.     Radha was contacted on 4/15/2024.    Refer to the encounter summary report for documentation details about patient counseling and education.      Impression/Plan  Is patient high risk? (potential patients:  pregnancy, geriatric, pediatric)   no  Is laboratory follow-up needed? no  Is a clinical intervention needed?  no  Next assessment date?  10/15/24  Additional comments:    Medication Adherence  The importance of adherence was discussed with the patient and they were advised to take the medication as prescribed by their provider. Radha was encouraged to call her physician's office if they have a question regarding a missed dose.     QOL/Patient Satisfaction  Rate your quality of life on scale of 1-10: -- (declined)  Rate your satisfaction with  Specialty Pharmacy on scale of 1-10: -- (declined)      Patient advised to contact the pharmacy if there are any changes to her medication list, including prescriptions, OTC medications, herbal products, or supplements. Patient was advised of [facility]’s dispensing process, refill timeline, contact information ([phone]), and patient management follow up. Patient confirmed understanding of education conducted during assessment. All patient questions and concerns were addressed to the best of my ability. Patient was encouraged to contact the specialty pharmacy with any questions or concerns.    Confirmed follow-up outreaches are properly scheduled. Reviewed goals of therapy in the program targets.    Gama Meyer, PharmD

## 2024-04-25 DIAGNOSIS — M45.A0 NON-RADIOGRAPHIC AXIAL SPONDYLOARTHRITIS (MULTI): ICD-10-CM

## 2024-04-25 RX ORDER — IXEKIZUMAB 80 MG/ML
80 INJECTION, SOLUTION SUBCUTANEOUS
Qty: 1 ML | Refills: 3 | Status: CANCELLED | OUTPATIENT
Start: 2024-04-25

## 2024-04-29 ENCOUNTER — SPECIALTY PHARMACY (OUTPATIENT)
Dept: PHARMACY | Facility: CLINIC | Age: 19
End: 2024-04-29

## 2024-04-29 DIAGNOSIS — M45.A0 NON-RADIOGRAPHIC AXIAL SPONDYLOARTHRITIS (MULTI): ICD-10-CM

## 2024-04-29 PROCEDURE — RXMED WILLOW AMBULATORY MEDICATION CHARGE

## 2024-04-29 RX ORDER — IXEKIZUMAB 80 MG/ML
80 INJECTION, SOLUTION SUBCUTANEOUS
Qty: 1 ML | Refills: 3 | Status: SHIPPED | OUTPATIENT
Start: 2024-04-29

## 2024-04-30 ENCOUNTER — APPOINTMENT (OUTPATIENT)
Dept: NEUROLOGY | Facility: CLINIC | Age: 19
End: 2024-04-30
Payer: COMMERCIAL

## 2024-05-01 ENCOUNTER — PHARMACY VISIT (OUTPATIENT)
Dept: PHARMACY | Facility: CLINIC | Age: 19
End: 2024-05-01
Payer: MEDICAID

## 2024-05-07 ENCOUNTER — OFFICE VISIT (OUTPATIENT)
Dept: NEUROLOGY | Facility: CLINIC | Age: 19
End: 2024-05-07
Payer: COMMERCIAL

## 2024-05-07 VITALS — DIASTOLIC BLOOD PRESSURE: 86 MMHG | SYSTOLIC BLOOD PRESSURE: 135 MMHG | HEART RATE: 96 BPM | TEMPERATURE: 97.6 F

## 2024-05-07 DIAGNOSIS — H47.49: ICD-10-CM

## 2024-05-07 DIAGNOSIS — G43.009 MIGRAINE WITHOUT AURA AND WITHOUT STATUS MIGRAINOSUS, NOT INTRACTABLE: Primary | ICD-10-CM

## 2024-05-07 DIAGNOSIS — G44.209 TENSION HEADACHE: ICD-10-CM

## 2024-05-07 PROCEDURE — 1036F TOBACCO NON-USER: CPT | Performed by: NURSE PRACTITIONER

## 2024-05-07 PROCEDURE — 99214 OFFICE O/P EST MOD 30 MIN: CPT | Performed by: NURSE PRACTITIONER

## 2024-05-07 RX ORDER — CYCLOBENZAPRINE HCL 5 MG
5 TABLET ORAL 2 TIMES DAILY
Qty: 60 TABLET | Refills: 2 | Status: SHIPPED | OUTPATIENT
Start: 2024-05-07

## 2024-05-07 RX ORDER — METHYLPREDNISOLONE 4 MG/1
TABLET ORAL
Qty: 21 TABLET | Refills: 0 | Status: SHIPPED | OUTPATIENT
Start: 2024-05-07 | End: 2024-05-14

## 2024-05-07 NOTE — PROGRESS NOTES
Patient being assessed today for follow-up of migraine.  She reports that over the past 4 to 6 weeks her breakthrough activity has increased.  She has been averaging 17 migraine days per month.  She does utilize the rizatriptan which is effective for her.  She reports also that the Qulipta is effective as per timing she can tell if she later in taking it and what her normal schedule is of the day because her head will start to hurt.  She did have an MRI completed back in February it did show right optic nerve chiasm thickening.  For now we will continue the Qulipta and rizatriptan as she has been taking it.  Will give her a Medrol Dosepak to see if that helps due to the frequent barometric pressure changes as well as seasonal allergies.  Referral placed for Dr. Rincon for further evaluation of her optic nerves.  Discussed role of medicine,  importance of taking medications, potential risks, benefits, and precautions to be taken.  Reviewed sleep hygiene and dietary modifications.  Patient is notify office if the Medrol Dosepak is not effective.  Otherwise follow-up in 1 year or sooner if needed.    This note was created with voice recognition software and was not corrected for typographical or grammatical errors

## 2024-05-19 DIAGNOSIS — G43.009 MIGRAINE WITHOUT AURA AND WITHOUT STATUS MIGRAINOSUS, NOT INTRACTABLE: ICD-10-CM

## 2024-05-21 RX ORDER — ATOGEPANT 60 MG/1
60 TABLET ORAL DAILY
Qty: 30 TABLET | Refills: 0 | Status: SHIPPED | OUTPATIENT
Start: 2024-05-21

## 2024-05-28 ENCOUNTER — SPECIALTY PHARMACY (OUTPATIENT)
Dept: PHARMACY | Facility: CLINIC | Age: 19
End: 2024-05-28

## 2024-05-30 PROCEDURE — RXMED WILLOW AMBULATORY MEDICATION CHARGE

## 2024-05-31 ENCOUNTER — PHARMACY VISIT (OUTPATIENT)
Dept: PHARMACY | Facility: CLINIC | Age: 19
End: 2024-05-31
Payer: MEDICAID

## 2024-06-14 DIAGNOSIS — G43.009 MIGRAINE WITHOUT AURA AND WITHOUT STATUS MIGRAINOSUS, NOT INTRACTABLE: ICD-10-CM

## 2024-06-15 RX ORDER — ATOGEPANT 60 MG/1
60 TABLET ORAL DAILY
Qty: 30 TABLET | Refills: 0 | Status: SHIPPED | OUTPATIENT
Start: 2024-06-15

## 2024-07-01 ENCOUNTER — OFFICE VISIT (OUTPATIENT)
Dept: RHEUMATOLOGY | Facility: CLINIC | Age: 19
End: 2024-07-01
Payer: COMMERCIAL

## 2024-07-01 VITALS — SYSTOLIC BLOOD PRESSURE: 120 MMHG | DIASTOLIC BLOOD PRESSURE: 82 MMHG | BODY MASS INDEX: 37.77 KG/M2 | WEIGHT: 234 LBS

## 2024-07-01 DIAGNOSIS — M45.A0 NON-RADIOGRAPHIC AXIAL SPONDYLOARTHRITIS (MULTI): Primary | ICD-10-CM

## 2024-07-01 PROCEDURE — 99214 OFFICE O/P EST MOD 30 MIN: CPT | Performed by: INTERNAL MEDICINE

## 2024-07-01 NOTE — PROGRESS NOTES
"Recheck  Non-Radiographic SpA   Doing well     HPI \"pretty good\"  She has some shin and back pain.  She had shin surg 4 yrs ago after she fell, and this is the area that hurts.  She has not started PT - she wants a place closer to home.  No swelling.  AM stiffness 30 min.  No CP, resp, or GI.  She is on indomethacin from neuro    PE  NAD  RRR no r/m/g  CTA  No edema  No synovitis  NT and no pain with ROM throughout  No shin pain    A/P - Nonradiographic SpA, doing better with taltz but still some pain, poss mechanical pain  Refer to PT  Reeval 3 mo    "

## 2024-07-02 ENCOUNTER — SPECIALTY PHARMACY (OUTPATIENT)
Dept: PHARMACY | Facility: CLINIC | Age: 19
End: 2024-07-02

## 2024-07-02 ENCOUNTER — PHARMACY VISIT (OUTPATIENT)
Dept: PHARMACY | Facility: CLINIC | Age: 19
End: 2024-07-02
Payer: MEDICAID

## 2024-07-02 PROCEDURE — RXMED WILLOW AMBULATORY MEDICATION CHARGE

## 2024-07-16 DIAGNOSIS — G43.009 MIGRAINE WITHOUT AURA AND WITHOUT STATUS MIGRAINOSUS, NOT INTRACTABLE: ICD-10-CM

## 2024-07-16 RX ORDER — ATOGEPANT 60 MG/1
60 TABLET ORAL DAILY
Qty: 30 TABLET | Refills: 0 | Status: SHIPPED | OUTPATIENT
Start: 2024-07-16

## 2024-07-17 DIAGNOSIS — G43.009 MIGRAINE WITHOUT AURA AND WITHOUT STATUS MIGRAINOSUS, NOT INTRACTABLE: ICD-10-CM

## 2024-07-25 ENCOUNTER — SPECIALTY PHARMACY (OUTPATIENT)
Dept: PHARMACY | Facility: CLINIC | Age: 19
End: 2024-07-25

## 2024-07-25 PROCEDURE — RXMED WILLOW AMBULATORY MEDICATION CHARGE

## 2024-07-31 ENCOUNTER — PHARMACY VISIT (OUTPATIENT)
Dept: PHARMACY | Facility: CLINIC | Age: 19
End: 2024-07-31
Payer: MEDICAID

## 2024-08-14 ENCOUNTER — SPECIALTY PHARMACY (OUTPATIENT)
Dept: PHARMACY | Facility: CLINIC | Age: 19
End: 2024-08-14

## 2024-08-14 DIAGNOSIS — M45.A0 NON-RADIOGRAPHIC AXIAL SPONDYLOARTHRITIS (MULTI): ICD-10-CM

## 2024-08-14 RX ORDER — IXEKIZUMAB 80 MG/ML
80 INJECTION, SOLUTION SUBCUTANEOUS
Qty: 1 ML | Refills: 3 | Status: SHIPPED | OUTPATIENT
Start: 2024-08-14

## 2024-08-17 DIAGNOSIS — G43.009 MIGRAINE WITHOUT AURA AND WITHOUT STATUS MIGRAINOSUS, NOT INTRACTABLE: ICD-10-CM

## 2024-08-18 DIAGNOSIS — G43.009 MIGRAINE WITHOUT AURA AND WITHOUT STATUS MIGRAINOSUS, NOT INTRACTABLE: ICD-10-CM

## 2024-08-19 RX ORDER — LASMIDITAN 100 MG/1
TABLET ORAL
Qty: 8 TABLET | Refills: 0 | Status: SHIPPED | OUTPATIENT
Start: 2024-08-19

## 2024-08-19 RX ORDER — ATOGEPANT 60 MG/1
60 TABLET ORAL DAILY
Qty: 30 TABLET | Refills: 0 | Status: SHIPPED | OUTPATIENT
Start: 2024-08-19

## 2024-08-20 PROCEDURE — RXMED WILLOW AMBULATORY MEDICATION CHARGE

## 2024-08-26 ENCOUNTER — SPECIALTY PHARMACY (OUTPATIENT)
Dept: PHARMACY | Facility: CLINIC | Age: 19
End: 2024-08-26

## 2024-08-27 ENCOUNTER — PHARMACY VISIT (OUTPATIENT)
Dept: PHARMACY | Facility: CLINIC | Age: 19
End: 2024-08-27
Payer: MEDICAID

## 2024-08-28 ENCOUNTER — PATIENT MESSAGE (OUTPATIENT)
Dept: NEUROLOGY | Facility: CLINIC | Age: 19
End: 2024-08-28
Payer: COMMERCIAL

## 2024-08-28 DIAGNOSIS — G43.009 MIGRAINE WITHOUT AURA AND WITHOUT STATUS MIGRAINOSUS, NOT INTRACTABLE: ICD-10-CM

## 2024-08-29 RX ORDER — METHYLPREDNISOLONE 4 MG/1
TABLET ORAL
Qty: 21 TABLET | Refills: 0 | Status: SHIPPED | OUTPATIENT
Start: 2024-08-29 | End: 2024-09-05

## 2024-09-10 DIAGNOSIS — H47.49: Primary | ICD-10-CM

## 2024-09-16 DIAGNOSIS — G43.009 MIGRAINE WITHOUT AURA AND WITHOUT STATUS MIGRAINOSUS, NOT INTRACTABLE: ICD-10-CM

## 2024-09-16 RX ORDER — ATOGEPANT 60 MG/1
60 TABLET ORAL DAILY
Qty: 30 TABLET | Refills: 11 | Status: SHIPPED | OUTPATIENT
Start: 2024-09-16

## 2024-09-20 DIAGNOSIS — G43.009 MIGRAINE WITHOUT AURA AND WITHOUT STATUS MIGRAINOSUS, NOT INTRACTABLE: ICD-10-CM

## 2024-09-20 PROCEDURE — RXMED WILLOW AMBULATORY MEDICATION CHARGE

## 2024-09-20 RX ORDER — LASMIDITAN 100 MG/1
TABLET ORAL
Qty: 8 TABLET | Refills: 0 | Status: SHIPPED | OUTPATIENT
Start: 2024-09-20

## 2024-09-24 ENCOUNTER — PHARMACY VISIT (OUTPATIENT)
Dept: PHARMACY | Facility: CLINIC | Age: 19
End: 2024-09-24
Payer: MEDICAID

## 2024-10-01 ENCOUNTER — OFFICE VISIT (OUTPATIENT)
Dept: RHEUMATOLOGY | Facility: CLINIC | Age: 19
End: 2024-10-01
Payer: COMMERCIAL

## 2024-10-01 VITALS — SYSTOLIC BLOOD PRESSURE: 108 MMHG | BODY MASS INDEX: 37.54 KG/M2 | DIASTOLIC BLOOD PRESSURE: 66 MMHG | WEIGHT: 232.6 LBS

## 2024-10-01 DIAGNOSIS — M45.A0 NON-RADIOGRAPHIC AXIAL SPONDYLOARTHRITIS (MULTI): ICD-10-CM

## 2024-10-01 PROCEDURE — 99214 OFFICE O/P EST MOD 30 MIN: CPT | Performed by: INTERNAL MEDICINE

## 2024-10-01 NOTE — PROGRESS NOTES
Recheck  Non-Radigraphic SpA  bilat rib cage pain increased pain with deep breathing x 2 months.    HPI - she has rib pain, mainly post, that incr with deep insp or sitting up straight.  It feels better when she lies down on her back, goes to sleep, or doesn't sit up straight.  She has LBP as well.  She is to start PT tomorrow.  She takes indocin from neuro.   No other pain/swelling.  AM stiffness 30-45 min.  Occ  CP separate from rib pain.  No resp or GI    PE  NAD  RRR no r/m/g  CTA  No edema  No synovitis  Sternal tenderness  Ribs and back NT  NT and no pain with ROM otherwise    A/P - Nonradiographic SpA and chronic pain - rib pain x 2 mo  She is to start PT, which hopefully will help with rib and back pain  Follow up 3 mo or sooner PRN

## 2024-10-04 DIAGNOSIS — G43.709 CHRONIC MIGRAINE WITHOUT AURA WITHOUT STATUS MIGRAINOSUS, NOT INTRACTABLE: ICD-10-CM

## 2024-10-04 DIAGNOSIS — R11.2 NAUSEA AND VOMITING, UNSPECIFIED VOMITING TYPE: ICD-10-CM

## 2024-10-04 RX ORDER — INDOMETHACIN 75 MG/1
CAPSULE, EXTENDED RELEASE ORAL
Qty: 30 CAPSULE | Refills: 0 | Status: SHIPPED | OUTPATIENT
Start: 2024-10-04

## 2024-10-04 RX ORDER — ONDANSETRON 4 MG/1
TABLET, FILM COATED ORAL
Qty: 20 TABLET | Refills: 0 | Status: SHIPPED | OUTPATIENT
Start: 2024-10-04

## 2024-10-15 ENCOUNTER — TELEMEDICINE CLINICAL SUPPORT (OUTPATIENT)
Dept: PHARMACY | Facility: HOSPITAL | Age: 19
End: 2024-10-15
Payer: COMMERCIAL

## 2024-10-15 NOTE — PROGRESS NOTES
[facility] Clinical Note    Radha Pereira is a 19 y.o. female, who is on the specialty pharmacy service for management of:  Rheumatology Core.    Radha Pereira is taking: Taltz 80 mg subcutaneous every 28 days.    Medication Receipt Date: 8/14/2024  Medication Start Date (planned or actual): continuation of therapy     Radha was contacted on 10/15/2024 at 11:30 AM for a virtual pharmacy visit with encounter number [id number] from:   [facility]  [address]  [facility] [address]  Dept: [phone]  Dept Fax: [phone]  Loc: [phone]    Radha was offered a Telemedicine Video visit or Telephone visit.  Radha consented to a Telemedicine visit, which was performed.    The most recent encounter visit with the referring prescriber Dr. Astorga on 10/1/24 was reviewed.  Pharmacy will continue to collaborate in the care of this patient with the referring prescriber Dr. Astorga.    General Assessment  Changes in home medications (OTCs, Supplements, Rxs), allergies, or health conditions: No    Specialty Medication in review: Taltz 80 mg subcutaneous every 28 days    TOLERANCE: Injection site reaction     EFFICACY: Starts to get pain 1 week before next injection     GOALS: Decrease non-radiographic axial spondyloarthritis disease activity - decrease joint pain, swelling, and increase mobility      COMPLIANCE / ADHERENCE:     Any barriers to adherence: No     Any barriers to self-administration (including physical and mental)? No        PATIENT MANAGEMENT:  Patient has no questions regarding medications or care. Patient voices no concerns with access to medications at this time.      Impression/Plan  IMPRESSION/PLAN:  Is patient high risk (potential patients:  pregnancy, geriatric, pediatric)?  No  Is laboratory follow-up needed? No  Is a clinical intervention needed? No  Next reassessment date? 4/15/2025  Additional comments:    ALL labs past month:   No visits with results within 1 Month(s) from this visit.   Latest known visit with results is:   Lab on 11/17/2023   Component Date Value Ref Range Status    WBC 11/17/2023 10.2  4.4 - 11.3 x10*3/uL Final    nRBC 11/17/2023 0.0  0.0 - 0.0 /100 WBCs Final    RBC 11/17/2023 4.60  4.00 - 5.20 x10*6/uL Final    Hemoglobin 11/17/2023 13.8  12.0 - 16.0 g/dL Final    Hematocrit 11/17/2023 41.2  36.0 - 46.0 % Final    MCV 11/17/2023 90  80 - 100 fL Final    MCH 11/17/2023 30.0  26.0 - 34.0 pg Final    MCHC 11/17/2023 33.5  32.0 - 36.0 g/dL Final    RDW 11/17/2023 11.9  11.5 - 14.5 % Final    Platelets 11/17/2023 277  150 - 450 x10*3/uL Final    Neutrophils % 11/17/2023 43.2  40.0 - 80.0 % Final    Immature Granulocytes %, Automated 11/17/2023 0.1  0.0 - 0.9 % Final    Lymphocytes % 11/17/2023 39.2  13.0 - 44.0 % Final    Monocytes % 11/17/2023 7.5  2.0 - 10.0 % Final    Eosinophils % 11/17/2023 9.1  0.0 - 6.0 % Final    Basophils % 11/17/2023 0.9  0.0 - 2.0 % Final    Neutrophils Absolute 11/17/2023 4.40  1.20 - 7.70 x10*3/uL Final    Immature Granulocytes Absolute, Au* 11/17/2023 0.01  0.00 - 0.70 x10*3/uL Final    Lymphocytes Absolute 11/17/2023 3.98  1.20 - 4.80 x10*3/uL Final    Monocytes Absolute 11/17/2023 0.76  0.10 - 1.00 x10*3/uL Final    Eosinophils Absolute 11/17/2023 0.92 (H)  0.00 - 0.70 x10*3/uL Final    Basophils Absolute 11/17/2023 0.09  0.00 - 0.10 x10*3/uL Final    C-Reactive Protein 11/17/2023 0.14  <1.00 mg/dL Final    Glucose 11/17/2023 87  74 - 99 mg/dL Final    Sodium 11/17/2023 140  136 - 145 mmol/L Final    Potassium 11/17/2023 3.9  3.5 - 5.3 mmol/L Final    Chloride 11/17/2023 105  98 - 107 mmol/L Final    Bicarbonate 11/17/2023 28  21 - 32 mmol/L Final    Anion Gap 11/17/2023 11  10 - 20 mmol/L Final    Urea Nitrogen 11/17/2023 12  6 - 23 mg/dL Final    Creatinine 11/17/2023 0.86  0.50 - 1.05 mg/dL Final    eGFR 11/17/2023 >90  >60 mL/min/1.73m*2 Final    Calcium  "11/17/2023 9.6  8.6 - 10.6 mg/dL Final    Albumin 11/17/2023 4.7  3.4 - 5.0 g/dL Final    Alkaline Phosphatase 11/17/2023 43  33 - 110 U/L Final    Total Protein 11/17/2023 7.0  6.4 - 8.2 g/dL Final    AST 11/17/2023 15  9 - 39 U/L Final    Bilirubin, Total 11/17/2023 1.1  0.0 - 1.2 mg/dL Final    ALT 11/17/2023 24  7 - 45 U/L Final       Refer to the encounter summary report for documentation details about patient counseling and education.      Medication Adherence    The importance of adherence was discussed with the patient and they were advised to take the medication as prescribed by their provider. Patient was encouraged to call their physician's office if they have a question regarding a missed dose.     QOL/Patient Satisfaction  Rate your quality of life on scale of 1-10: 5  Rate your satisfaction with  Specialty Pharmacy on scale of 1-10: -- (\"don't need anything at this time\")      Patient was advised to contact the pharmacy if there are any changes to their medication list, including prescriptions, OTC medications, herbal products, or supplements. Patient was advised of [facility]'s dispensing process, refill timeline, contact information ([phone]), and patient management follow up. Patient confirmed understanding of education conducted during assessment. All patient questions and concerns were addressed to the best of my ability. Patient was encouraged to contact the specialty pharmacy with any questions or concerns.    Confirmed follow-up outreaches are properly scheduled and reviewed goals of therapy with the patient.        TRAVIS FLORES  "

## 2024-10-24 ENCOUNTER — PHARMACY VISIT (OUTPATIENT)
Dept: PHARMACY | Facility: CLINIC | Age: 19
End: 2024-10-24
Payer: MEDICAID

## 2024-10-24 ENCOUNTER — SPECIALTY PHARMACY (OUTPATIENT)
Dept: PHARMACY | Facility: CLINIC | Age: 19
End: 2024-10-24

## 2024-10-24 PROCEDURE — RXMED WILLOW AMBULATORY MEDICATION CHARGE

## 2024-11-04 DIAGNOSIS — R11.2 NAUSEA AND VOMITING, UNSPECIFIED VOMITING TYPE: ICD-10-CM

## 2024-11-04 DIAGNOSIS — G43.009 MIGRAINE WITHOUT AURA AND WITHOUT STATUS MIGRAINOSUS, NOT INTRACTABLE: ICD-10-CM

## 2024-11-04 DIAGNOSIS — G43.709 CHRONIC MIGRAINE WITHOUT AURA WITHOUT STATUS MIGRAINOSUS, NOT INTRACTABLE: ICD-10-CM

## 2024-11-05 RX ORDER — INDOMETHACIN 75 MG/1
CAPSULE, EXTENDED RELEASE ORAL
Qty: 30 CAPSULE | Refills: 3 | Status: SHIPPED | OUTPATIENT
Start: 2024-11-05

## 2024-11-05 RX ORDER — ADALIMUMAB 40MG/0.4ML
KIT SUBCUTANEOUS
COMMUNITY
Start: 2022-12-05

## 2024-11-05 RX ORDER — METFORMIN HYDROCHLORIDE 1000 MG/1
TABLET ORAL
COMMUNITY
Start: 2024-07-31

## 2024-11-05 RX ORDER — GABAPENTIN 100 MG/1
100 CAPSULE ORAL
COMMUNITY
Start: 2024-02-02 | End: 2025-01-27

## 2024-11-05 RX ORDER — RIZATRIPTAN BENZOATE 10 MG/1
TABLET ORAL
Qty: 9 TABLET | Refills: 6 | Status: SHIPPED | OUTPATIENT
Start: 2024-11-05

## 2024-11-05 RX ORDER — DROSPIRENONE, ETHINYL ESTRADIOL AND LEVOMEFOLATE CALCIUM AND LEVOMEFOLATE CALCIUM 3-0.02(24)
1 KIT ORAL
COMMUNITY
Start: 2024-10-10

## 2024-11-05 RX ORDER — ONDANSETRON 4 MG/1
TABLET, FILM COATED ORAL
Qty: 30 TABLET | Refills: 3 | Status: SHIPPED | OUTPATIENT
Start: 2024-11-05

## 2024-11-05 RX ORDER — FLUTICASONE PROPIONATE 50 MCG
2 SPRAY, SUSPENSION (ML) NASAL
COMMUNITY
Start: 2021-02-19 | End: 2025-01-16

## 2024-11-05 RX ORDER — MULTIVITAMIN
TABLET ORAL
COMMUNITY

## 2024-11-05 RX ORDER — FLUOXETINE 10 MG/1
CAPSULE ORAL
COMMUNITY
Start: 2024-10-21

## 2024-11-05 RX ORDER — CALCIUM CARBONATE 500(1250)
TABLET ORAL
COMMUNITY

## 2024-11-05 RX ORDER — FLUOXETINE HYDROCHLORIDE 20 MG/1
1 CAPSULE ORAL
COMMUNITY
Start: 2024-10-19

## 2024-11-05 RX ORDER — LASMIDITAN 100 MG/1
TABLET ORAL
Qty: 8 TABLET | Refills: 4 | Status: SHIPPED | OUTPATIENT
Start: 2024-11-05

## 2024-11-18 ENCOUNTER — OFFICE VISIT (OUTPATIENT)
Dept: RHEUMATOLOGY | Facility: CLINIC | Age: 19
End: 2024-11-18
Payer: COMMERCIAL

## 2024-11-18 ENCOUNTER — LAB (OUTPATIENT)
Dept: LAB | Facility: LAB | Age: 19
End: 2024-11-18
Payer: COMMERCIAL

## 2024-11-18 VITALS — DIASTOLIC BLOOD PRESSURE: 70 MMHG | WEIGHT: 232 LBS | BODY MASS INDEX: 37.45 KG/M2 | SYSTOLIC BLOOD PRESSURE: 118 MMHG

## 2024-11-18 DIAGNOSIS — M45.A0 NON-RADIOGRAPHIC AXIAL SPONDYLOARTHRITIS (MULTI): Primary | ICD-10-CM

## 2024-11-18 DIAGNOSIS — G89.29 CHRONIC LOW BACK PAIN WITHOUT SCIATICA, UNSPECIFIED BACK PAIN LATERALITY: ICD-10-CM

## 2024-11-18 DIAGNOSIS — M45.A0 NON-RADIOGRAPHIC AXIAL SPONDYLOARTHRITIS (MULTI): ICD-10-CM

## 2024-11-18 DIAGNOSIS — M54.50 CHRONIC LOW BACK PAIN WITHOUT SCIATICA, UNSPECIFIED BACK PAIN LATERALITY: ICD-10-CM

## 2024-11-18 LAB
ALBUMIN SERPL BCP-MCNC: 4.2 G/DL (ref 3.4–5)
ALP SERPL-CCNC: 58 U/L (ref 33–110)
ALT SERPL W P-5'-P-CCNC: 13 U/L (ref 7–45)
ANION GAP SERPL CALC-SCNC: 12 MMOL/L (ref 10–20)
AST SERPL W P-5'-P-CCNC: 15 U/L (ref 9–39)
BASOPHILS # BLD AUTO: 0.05 X10*3/UL (ref 0–0.1)
BASOPHILS NFR BLD AUTO: 0.7 %
BILIRUB SERPL-MCNC: 0.7 MG/DL (ref 0–1.2)
BUN SERPL-MCNC: 15 MG/DL (ref 6–23)
CALCIUM SERPL-MCNC: 9.7 MG/DL (ref 8.6–10.6)
CHLORIDE SERPL-SCNC: 105 MMOL/L (ref 98–107)
CO2 SERPL-SCNC: 29 MMOL/L (ref 21–32)
CREAT SERPL-MCNC: 0.91 MG/DL (ref 0.5–1.05)
CRP SERPL-MCNC: 0.48 MG/DL
EGFRCR SERPLBLD CKD-EPI 2021: >90 ML/MIN/1.73M*2
EOSINOPHIL # BLD AUTO: 0.32 X10*3/UL (ref 0–0.7)
EOSINOPHIL NFR BLD AUTO: 4.7 %
ERYTHROCYTE [DISTWIDTH] IN BLOOD BY AUTOMATED COUNT: 11.9 % (ref 11.5–14.5)
GLUCOSE SERPL-MCNC: 88 MG/DL (ref 74–99)
HCT VFR BLD AUTO: 41.7 % (ref 36–46)
HGB BLD-MCNC: 13.7 G/DL (ref 12–16)
IMM GRANULOCYTES # BLD AUTO: 0.02 X10*3/UL (ref 0–0.7)
IMM GRANULOCYTES NFR BLD AUTO: 0.3 % (ref 0–0.9)
LYMPHOCYTES # BLD AUTO: 2.12 X10*3/UL (ref 1.2–4.8)
LYMPHOCYTES NFR BLD AUTO: 30.9 %
MCH RBC QN AUTO: 29.7 PG (ref 26–34)
MCHC RBC AUTO-ENTMCNC: 32.9 G/DL (ref 32–36)
MCV RBC AUTO: 90 FL (ref 80–100)
MONOCYTES # BLD AUTO: 0.53 X10*3/UL (ref 0.1–1)
MONOCYTES NFR BLD AUTO: 7.7 %
NEUTROPHILS # BLD AUTO: 3.82 X10*3/UL (ref 1.2–7.7)
NEUTROPHILS NFR BLD AUTO: 55.7 %
NRBC BLD-RTO: 0 /100 WBCS (ref 0–0)
PLATELET # BLD AUTO: 279 X10*3/UL (ref 150–450)
POTASSIUM SERPL-SCNC: 4.3 MMOL/L (ref 3.5–5.3)
PROT SERPL-MCNC: 7 G/DL (ref 6.4–8.2)
RBC # BLD AUTO: 4.62 X10*6/UL (ref 4–5.2)
SODIUM SERPL-SCNC: 142 MMOL/L (ref 136–145)
WBC # BLD AUTO: 6.9 X10*3/UL (ref 4.4–11.3)

## 2024-11-18 PROCEDURE — 80053 COMPREHEN METABOLIC PANEL: CPT

## 2024-11-18 PROCEDURE — 99214 OFFICE O/P EST MOD 30 MIN: CPT | Performed by: INTERNAL MEDICINE

## 2024-11-18 PROCEDURE — 85025 COMPLETE CBC W/AUTO DIFF WBC: CPT

## 2024-11-18 PROCEDURE — 86140 C-REACTIVE PROTEIN: CPT

## 2024-11-18 PROCEDURE — 36415 COLL VENOUS BLD VENIPUNCTURE: CPT

## 2024-11-19 ENCOUNTER — APPOINTMENT (OUTPATIENT)
Dept: NEUROLOGY | Facility: CLINIC | Age: 19
End: 2024-11-19
Payer: COMMERCIAL

## 2024-11-19 VITALS
TEMPERATURE: 97.4 F | SYSTOLIC BLOOD PRESSURE: 100 MMHG | DIASTOLIC BLOOD PRESSURE: 70 MMHG | HEART RATE: 68 BPM | RESPIRATION RATE: 20 BRPM

## 2024-11-19 DIAGNOSIS — C72.30 GLIOMA OF OPTIC NERVE: ICD-10-CM

## 2024-11-19 DIAGNOSIS — R11.0 NAUSEA: ICD-10-CM

## 2024-11-19 DIAGNOSIS — M54.2 CERVICALGIA: ICD-10-CM

## 2024-11-19 DIAGNOSIS — G43.009 MIGRAINE WITHOUT AURA AND WITHOUT STATUS MIGRAINOSUS, NOT INTRACTABLE: ICD-10-CM

## 2024-11-19 DIAGNOSIS — M54.2 NECK PAIN: Primary | ICD-10-CM

## 2024-11-19 DIAGNOSIS — G44.099 TRIGEMINAL AUTONOMIC CEPHALGIAS: ICD-10-CM

## 2024-11-19 PROBLEM — G43.911 INTRACTABLE MIGRAINE WITH STATUS MIGRAINOSUS: Status: RESOLVED | Noted: 2023-01-06 | Resolved: 2024-11-19

## 2024-11-19 PROBLEM — R51.9 HEADACHE: Status: RESOLVED | Noted: 2020-11-18 | Resolved: 2024-11-19

## 2024-11-19 PROBLEM — G44.009 CLUSTER HEADACHE: Status: RESOLVED | Noted: 2023-08-31 | Resolved: 2024-11-19

## 2024-11-19 PROBLEM — G93.9 BRAIN LESION: Status: RESOLVED | Noted: 2023-08-31 | Resolved: 2024-11-19

## 2024-11-19 PROBLEM — G44.40 REBOUND HEADACHE: Status: RESOLVED | Noted: 2023-08-31 | Resolved: 2024-11-19

## 2024-11-19 PROBLEM — R90.89 ABNORMAL BRAIN MRI: Status: RESOLVED | Noted: 2023-08-31 | Resolved: 2024-11-19

## 2024-11-19 PROCEDURE — 99215 OFFICE O/P EST HI 40 MIN: CPT | Performed by: PSYCHIATRY & NEUROLOGY

## 2024-11-19 PROCEDURE — 96372 THER/PROPH/DIAG INJ SC/IM: CPT | Performed by: PSYCHIATRY & NEUROLOGY

## 2024-11-19 RX ORDER — FLUOXETINE HYDROCHLORIDE 20 MG/1
20 CAPSULE ORAL DAILY
COMMUNITY

## 2024-11-19 RX ORDER — KETOROLAC TROMETHAMINE 30 MG/ML
60 INJECTION, SOLUTION INTRAMUSCULAR; INTRAVENOUS ONCE
Status: COMPLETED | OUTPATIENT
Start: 2024-11-19 | End: 2024-11-19

## 2024-11-19 RX ORDER — KETOROLAC TROMETHAMINE 10 MG/1
10 TABLET, FILM COATED ORAL EVERY 6 HOURS
Qty: 20 TABLET | Refills: 0 | Status: SHIPPED | OUTPATIENT
Start: 2024-11-19 | End: 2024-11-24

## 2024-11-19 RX ORDER — METHOCARBAMOL 750 MG/1
750 TABLET, FILM COATED ORAL 2 TIMES DAILY
Qty: 720 TABLET | Refills: 0 | Status: SHIPPED | OUTPATIENT
Start: 2024-11-19 | End: 2025-11-19

## 2024-11-19 RX ORDER — ONDANSETRON HYDROCHLORIDE 2 MG/ML
4 INJECTION, SOLUTION INTRAVENOUS ONCE
Status: COMPLETED | OUTPATIENT
Start: 2024-11-19 | End: 2024-11-19

## 2024-11-19 ASSESSMENT — PATIENT HEALTH QUESTIONNAIRE - PHQ9
SUM OF ALL RESPONSES TO PHQ9 QUESTIONS 1 AND 2: 0
2. FEELING DOWN, DEPRESSED OR HOPELESS: NOT AT ALL
1. LITTLE INTEREST OR PLEASURE IN DOING THINGS: NOT AT ALL

## 2024-11-19 NOTE — PROGRESS NOTES
"Migraines have increased since end of August 2024. Head pain became daily at that time and had not been previously- headache was 3-4 days per week. 3-4 days headache-free at that time.     Pain is some time every day.    Headache most days and migraine most days.   Missing more school due to migraines.   Character of head pain has not changed.     Head pain for both headache and migraine is right frontal behind eye and extending into side of face. Describes as pressure and random sharp pains.   With migraine intensity, associated light sensitivity and nausea. Zofran is helpful for nausea.   Triggers are none in particular.     Current level of head pain 5/10. Toradol IM and PO has been helpful and would like today.   Also IM Zofran for nausea    Is not treating daily with anything. OTCs are not effective. Does not take migraine treatment daily to avoid overuse.   Will treat with Rizatriptan. Will take away completely for most of the day. If returns or not complete treatment will use Reyvow at bed. Sometimes will wake next morning without headache after Reyvow 2 x a month  Sumatriptan oral and injection were not effective for her.   Treating with Rizatriptan 2-4 times per week and Reyvow a couple times per month.     Continues qulitpa 60mg daily and lamictal 200mg daily for prevention    Taking once daily Indocin 75mg.     Sleep is \"ok\". Sometimes wakes in middle of night. Averages 6-8 hours.     Prozac working well to manage depression and anxiety.   Sees psychiatrist every 3 months. Goes to [facility] care. Severo Charles made her slur her words. Took twice. NO longer taking.   Has not been taking metformin due to turning 19 and not supposed to take if drinking. To discuss with endocrine at December visit.     Preventives  Fluoxetine- anxiety and depression  [id number]/300 didn't help and caused fatigue so lowered dose  Cymbalta- weight gain  Cyproheptadine- not helpful denies side effects   Topamax-helpful then " stopped helping. tingling   Verapamil- not effective  Aimovig not helpful.   Emgality not helpful migraine and cluster dosing  Zonisamide- not helpful.   Qulipta daily. Current and helpful  Lamictal current-      Treatments.   Sumatriptan oral and injection.   Geovany Escobar current  Rizatriptan current      To review what we discussed today   Assessment/Plan   Problem List Items Addressed This Visit       Glioma of optic nerve    Migraine without aura and without status migrainosus, not intractable     Continue with Qulipta.   Acute therapy continue with rizatriptan add robaxin if needed.          Relevant Medications    ketorolac (Toradol) injection 60 mg    ketorolac (Toradol) 10 mg tablet    Cervicalgia     Add robaxin 2 x a day and as needed for max ofr 4 pills a day. Consider nerivio         Trigeminal autonomic cephalgias     Indocin 75 mg a day          Other Visit Diagnoses       Neck pain    -  Primary    Relevant Medications    methocarbamol (Robaxin) 750 mg tablet    Nausea        Relevant Medications    ondansetron (Zofran) injection 4 mg            Your next appointment with me is 5/6/2025.    Thank you for visiting the office of Dr Annabel Paige. It was a pleasure working with you today.   [address] Mon-Thursday  [facility] 5th floor [facility] Fridays  Our office phone number is [phone]. You can use this number to leave messages for Guillermina or to schedule or reschedule an appointment or request refills.  If you were seen on Thursday afternoon or Friday our office will call on Monday or Tuesday to reschedule your appointment. If you do not receive a call please call us.   We are also available for messages on my chart. We make every effort to respond to your concerns by the end of the next business day.

## 2024-11-19 NOTE — LETTER
November 19, 2024     Adriana Palmer DO  [address]  [address] [address]    Patient: Radha Pereira   YOB: 2005   Date of Visit: 11/19/2024       Dear Dr. Adriana Palmer, :    Thank you for referring Radha Pereira to me for evaluation. Below are my notes for this consultation.  If you have questions, please do not hesitate to call me. I look forward to following your patient along with you.       Sincerely,     Annabel Paige MD      CC: MD Ferdinand Arevalo MD  ______________________________________________________________________________________    Migraines have increased since end of August 2024. Head pain became daily at that time and had not been previously- headache was 3-4 days per week. 3-4 days headache-free at that time.     Pain is some time every day.    Headache most days and migraine most days.   Missing more school due to migraines.   Character of head pain has not changed.     Head pain for both headache and migraine is right frontal behind eye and extending into side of face. Describes as pressure and random sharp pains.   With migraine intensity, associated light sensitivity and nausea. Zofran is helpful for nausea.   Triggers are none in particular.     Current level of head pain 5/10. Toradol IM and PO has been helpful and would like today.   Also IM Zofran for nausea    Is not treating daily with anything. OTCs are not effective. Does not take migraine treatment daily to avoid overuse.   Will treat with Rizatriptan. Will take away completely for most of the day. If returns or not complete treatment will use Reyvow at bed. Sometimes will wake next morning without headache after Reyvow 2 x a month  Sumatriptan oral and injection were not effective for her.   Treating with Rizatriptan 2-4 times per week and Reyvow a couple times per month.     Continues qulitpa 60mg daily and lamictal 200mg daily for  "prevention    Taking once daily Indocin 75mg.     Sleep is \"ok\". Sometimes wakes in middle of night. Averages 6-8 hours.     Prozac working well to manage depression and anxiety.   Sees psychiatrist every 3 months. Goes to Psych care. Alciraabdifatah Charles made her slur her words. Took twice. NO longer taking.   Has not been taking metformin due to turning 19 and not supposed to take if drinking. To discuss with endocrine at December visit.     Preventives  Fluoxetine- anxiety and depression  [id number]/300 didn't help and caused fatigue so lowered dose  Cymbalta- weight gain  Cyproheptadine- not helpful denies side effects   Topamax-helpful then stopped helping. tingling   Verapamil- not effective  Aimovig not helpful.   Emgality not helpful migraine and cluster dosing  Zonisamide- not helpful.   Qulipta daily. Current and helpful  Lamictal current-      Treatments.   Sumatriptan oral and injection.   Geovany Escobar current  Rizatriptan current  "

## 2024-11-22 ENCOUNTER — PHARMACY VISIT (OUTPATIENT)
Dept: PHARMACY | Facility: CLINIC | Age: 19
End: 2024-11-22
Payer: MEDICAID

## 2024-11-22 ENCOUNTER — SPECIALTY PHARMACY (OUTPATIENT)
Dept: PHARMACY | Facility: CLINIC | Age: 19
End: 2024-11-22

## 2024-11-22 PROCEDURE — RXMED WILLOW AMBULATORY MEDICATION CHARGE

## 2024-12-15 NOTE — PROGRESS NOTES
"Assessment and Plan    02/29/2024 MRI brain with IV contrast and MRA, which I personally reviewed, shows stable right temporal lobe lesion and right optic nerve prechiasmatic enlargement with radiology read of, \"The right optic chiasm and cisternal segment of the right optic nerve remain enlarged, unchanged.     The medial right temporal lobe has a focus of old hemorrhage and enhancement less prominent than the prior exam. This may represent resolving hemorrhage; underlying vascular malformation such as cavernous angioma is not excluded. No abnormally enlarged vessels in this region are identified\".     Interval head imaging.    06/19/2020 MRI brain without IV contrast, which I personally reviewed, shows enlargement of the right optic nerve near the chiasm and FLAIR hyperintense lesion in R temporal lobe, with radiology read of, \"Possible cavernous malformation with associated edema in the right temporal lobe.\"    Lab Results   Component Value Date/Time    CRP 0.48 11/18/2024 1103    CRP 0.14 11/17/2023 1412     Tuberculosis studies  Lab Results   Component Value Date/Time    TBSIN Negative 12/08/2022 1658     12/16/2024 OCT RNFL OD 77 with borderline S & N thinning & OS 93.  09/21/2021 OCT RNFL OD G82 thinning I/N & OS G93 ([facility])   11/24/2020 OCT RNFL OD G84 & OS G85 ([facility]) --> no normative data age <18  07/21/2020 OCT RNFL OD G83 & OS G93 ([facility]) --> no normative data age <18    12/16/2024 HVF 24-2 OD fovea 36, wnl MD +0.78 & OS fovea 39, wnl MD +1.31.  09/21/2021 HVF 24-2 OD Fovea 34, wnl MD -0.75 & OS Fovea 41, wnl MD -0.10.  07/21/2020 HVF 24-2 OD Fovea 37, wnl MD-2.93 & OS Fovea 42, wnl. MD-1.72.    This 19 year-old woman with a history of right prechiasmatic optic pathway glioma, right temporal lobe vascular malformation, migraines, insulin resistance, asthma, nonradiographic spondyloarthritis, possible PCOS presents for evaluation of optic pathway glioma.     She has good vision. I do not see changes in " vision or relative afferent pupillary defect related to the optic nerve glioma. Surveillance per Dr. Fuentes is a good option for now. Regarding headaches, she has some symptoms of idiopathic intracranial hypertension, but not evidence on eye examination. I recommend treating as primary headache and would not pursue lumbar puncture based on neuro-ophthalmological examination.    Plan    Headache treatment.  Surveillance.    Follow up in 1 year with HVF & OCT. (Dilated 12/16/2024)

## 2024-12-16 ENCOUNTER — APPOINTMENT (OUTPATIENT)
Dept: OPHTHALMOLOGY | Facility: CLINIC | Age: 19
End: 2024-12-16
Payer: COMMERCIAL

## 2024-12-16 DIAGNOSIS — H47.49: ICD-10-CM

## 2024-12-16 DIAGNOSIS — C72.30 GLIOMA OF OPTIC NERVE: Primary | ICD-10-CM

## 2024-12-16 PROCEDURE — 92083 EXTENDED VISUAL FIELD XM: CPT | Performed by: PSYCHIATRY & NEUROLOGY

## 2024-12-16 PROCEDURE — 92133 CPTRZD OPH DX IMG PST SGM ON: CPT | Performed by: PSYCHIATRY & NEUROLOGY

## 2024-12-16 PROCEDURE — 99215 OFFICE O/P EST HI 40 MIN: CPT | Performed by: PSYCHIATRY & NEUROLOGY

## 2024-12-16 ASSESSMENT — CONF VISUAL FIELD
OS_NORMAL: 1
OS_SUPERIOR_TEMPORAL_RESTRICTION: 0
OD_SUPERIOR_NASAL_RESTRICTION: 0
OD_INFERIOR_NASAL_RESTRICTION: 0
OD_SUPERIOR_TEMPORAL_RESTRICTION: 0
OD_NORMAL: 1
OS_INFERIOR_NASAL_RESTRICTION: 0
OS_SUPERIOR_NASAL_RESTRICTION: 0
OS_INFERIOR_TEMPORAL_RESTRICTION: 0
OD_INFERIOR_TEMPORAL_RESTRICTION: 0

## 2024-12-16 ASSESSMENT — ENCOUNTER SYMPTOMS
HEMATOLOGIC/LYMPHATIC NEGATIVE: 0
PSYCHIATRIC NEGATIVE: 0
ALLERGIC/IMMUNOLOGIC NEGATIVE: 0
CARDIOVASCULAR NEGATIVE: 0
CONSTITUTIONAL NEGATIVE: 0
EYES NEGATIVE: 1
NEUROLOGICAL NEGATIVE: 0
GASTROINTESTINAL NEGATIVE: 0
MUSCULOSKELETAL NEGATIVE: 0
RESPIRATORY NEGATIVE: 0
ENDOCRINE NEGATIVE: 0

## 2024-12-16 ASSESSMENT — TONOMETRY
IOP_METHOD: GOLDMANN APPLANATION
OD_IOP_MMHG: 12
OS_IOP_MMHG: 12

## 2024-12-16 ASSESSMENT — SLIT LAMP EXAM - LIDS
COMMENTS: NORMAL
COMMENTS: NORMAL

## 2024-12-16 ASSESSMENT — EXTERNAL EXAM - RIGHT EYE: OD_EXAM: NORMAL

## 2024-12-16 ASSESSMENT — VISUAL ACUITY
METHOD: SNELLEN - LINEAR
OS_CC: 20/25+1
OD_CC: 20/20
CORRECTION_TYPE: CONTACTS

## 2024-12-16 ASSESSMENT — CUP TO DISC RATIO
OD_RATIO: .4
OS_RATIO: .4

## 2024-12-16 ASSESSMENT — EXTERNAL EXAM - LEFT EYE: OS_EXAM: NORMAL

## 2024-12-17 ENCOUNTER — APPOINTMENT (OUTPATIENT)
Dept: ENDOCRINOLOGY | Facility: CLINIC | Age: 19
End: 2024-12-17
Payer: COMMERCIAL

## 2024-12-17 VITALS
HEART RATE: 77 BPM | SYSTOLIC BLOOD PRESSURE: 134 MMHG | HEIGHT: 66 IN | DIASTOLIC BLOOD PRESSURE: 83 MMHG | BODY MASS INDEX: 37.28 KG/M2 | WEIGHT: 232 LBS

## 2024-12-17 DIAGNOSIS — E28.2 POLYCYSTIC OVARIAN SYNDROME: Primary | ICD-10-CM

## 2024-12-17 PROCEDURE — 99214 OFFICE O/P EST MOD 30 MIN: CPT | Performed by: INTERNAL MEDICINE

## 2024-12-17 PROCEDURE — 3008F BODY MASS INDEX DOCD: CPT | Performed by: INTERNAL MEDICINE

## 2024-12-17 ASSESSMENT — PATIENT HEALTH QUESTIONNAIRE - PHQ9
SUM OF ALL RESPONSES TO PHQ9 QUESTIONS 1 & 2: 0
2. FEELING DOWN, DEPRESSED OR HOPELESS: NOT AT ALL
1. LITTLE INTEREST OR PLEASURE IN DOING THINGS: NOT AT ALL

## 2024-12-17 NOTE — PROGRESS NOTES
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      Last clinic visit 3/2024, she was resumed on metformin 1g bid.    Pertinent Hx:  --------------  -Pt began to experience headaches in November 2018 and began seeing pediatric neurology. She was refractory to multiple medications over the years, therefore the decision was made to undergo imaging.     Brain MRI was completed 6/19/20 and revealed a heterogeneous T2 hypointense signal associated with vasogenic edema in the right temporal lobe suggestive of possible cavernous malformation (no contrast given). A follow up MRI brain with contrast and MRA were performed to further characterize this lesion. This was completed on 6/25/2020 and revealed central enhancement of the right temporal lobe lesion. In addition, thickening of the pre-chiasmatic portion of the right optic nerve was identified with no associated enhancement. MRA was normal. MRI 8/2023 showed that the optic nerve glioma was unchanged from prior. Plan per neuro-onc (Dr. Ferdinand Fuentes 8/16/23) was for continued annual MRIs and ophthalmology evaluation     Pt has no history of neurocutaneous disorder. She has two cafe au lait macules (back and arm) and mother reports no known inguinal or axillary freckling. She has no history of neurofibromas. There is no family history of neurofibromatosis. NF 1 genetic testing was completed in the past and negative.      Pt is currently undergoing continued yearly imaging monitoring of these lesions with pediatric hematology/oncology and neurology      She was seen by Dr. Shonda Martinez/ophthalmology on 11/24/2020 and found to have normal  corrected VF, normal appearance to her fundi, normal visual fields. OCT was performed and revealed normal retinal nerve fiber thickness with subtle decrease when comparing right (slightly decreased) to left but both normal.   Last eval: 12/16/2024: no visual changes       In January 2023, patient saw OBGYN (records not available) and prolactin level was checked due to the patient telling her OBGYN that she had a mass near the pituitary. Prolactin was found to be elevated (31, records not available for confirmation). Recheck in March 2023 showed that it continued to be elevated (45.5, 3/8/23). Upon seeing endocrinology, her prolactin level had normalized to 14.3 (8/2023)     9/2023:   For insulin resistance, started on metformin.   For vitamin D deficiency, started on vitamin D 5000IU daily, and for c/f JULIETTE she was referred for sleep study which resulted without any abnormalities.      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 Follows with HA specialist  -Vision changes: Denies issues with peripheral vision.   -Galactorrhea: Denies  -Menstrual cycles: Reports periods have always been irregular since they began. She will often miss 2-3 months at a time. They are heavy when they come. Has been on a  variety of different birth control tablets. Se above  -Fractures: Denies  -Polyuria: Denies  -Sleep: Sleeps okay, 6-8 hrs per night. Pt lightly snores.   -Energy: Poor, wakes up feeling tired.   -Appetite: Good  -Weight: stable, lost 20 lbs over the past few years  -GERD: Denies   -CP: Occasionally, monitored by cardiology, thinks it is likely costochondritis  -Bruising: Endorsing bruising a bit more easily now compared to prior   -SOB: Denies    -Palpitations: Denies  -Diarrhea/Constipation: Denies  -Tremors: Denies  -Muscle cramps: calves, at night   -Muscle weakness: Denies  -Hot flashes: Denies  -Night sweats: Denies  -Excess body hair: Denies  -Skin changes: never had acne . Has some stretch marks over lower abdomen.   -Nausea/vomiting: Some nausea when she has HA and some occasional nausea at random times. Has zofran PRN. Last dose was few weeks ago.  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 30 tablet 11    calcium carbonate (Oscal) 500 mg calcium (1,250 mg) tablet Take by mouth once daily.      cholecalciferol (Vitamin D-3) 50 MCG (2000 UT) tablet Take by mouth.      drospirenone-e.estradioL-lm.FA (Saray Barrett) 3-0.02-0.451 mg (24) (4) Take 1 tablet by mouth early in the morning..      fish oil concentrate (Omega-3) 120-180 mg capsule Take by mouth once  daily.      FLUoxetine (PROzac) 10 mg capsule TAKE 1 CAPSULE BY MOUTH ONCE DAILY. TAKE WITH 20MG CAPSULE FOR TOTAL OF 30MG DAILY      FLUoxetine (PROzac) 20 mg capsule Take 1 capsule (20 mg) by mouth once daily. With 10 mg for 30 mg daily      gabapentin (Neurontin) 100 mg capsule 1 capsule (100 mg).      indomethacin SR (Indocin SR) 75 mg ER capsule TAKE 1 CAPSULE BY MOUTH ONCE DAILY WITH A MEAL 30 capsule 3    ixekizumab (Taltz Autoinjector) 80 mg/mL injection Inject 80mg (1 pen) under the skin every 28 (twenty-eight) days. 1 mL 3    lamoTRIgine (LaMICtal) 200 mg tablet Take 1 tablet (200 mg) by mouth once daily. 30 tablet 11    loratadine 10 mg capsule Take by mouth.      melatonin 3 mg tablet Take 2 tablets (6 mg) by mouth once daily at bedtime.      methocarbamol (Robaxin) 750 mg tablet Take 1 tablet (750 mg) by mouth 2 times a day. Until back neck and head pain improve. 720 tablet 0    montelukast (Singulair) 10 mg tablet Take 1 tablet (10 mg) by mouth once daily at bedtime. 30 tablet 11    multivitamin tablet Take by mouth.      ondansetron (Zofran) 4 mg tablet TAKE 1 TABLET BY MOUTH EVERY 8 HOURS AS NEEDED FOR NAUSEA AND VOMITING FOR 7 DAYS 30 tablet 3    Reyvow 100 mg tablet TAKE 1 TO 2 TABLETS BY MOUTH ONCE DAILY AS NEEDED FOR HEADACHE TO LAST 30 DAYS 8 tablet 4    rizatriptan (Maxalt) 10 mg tablet TAKE 1 TABLET BY MOUTH AT ONSET OF HEADEACHE, MAY REPEAT EVERY 2 HOURS AS NEEDED, MAXIMUM OF 3 TABLETS PER 24 HOURS 9 tablet 6    SUMAtriptan (Imitrex) 25 mg tablet Take by mouth.      SUMAtriptan (Imitrex) 6 mg/0.5 mL injection Inject under the skin.      Ventolin HFA 90 mcg/actuation inhaler Inhale 2 puffs every 6 hours if needed for wheezing.      metFORMIN (Glucophage) 1,000 mg tablet TAKE 1/2 (ONE-HALF) TAB DAILY FOR 1 WEEK WITH FOOD. THEN TAKE 1/2 TWICE PER DAY FOR 1 WEEK WITH FOOD. THEN TAKE 1/2 TAB IN MORNING AND 1 TAB IN EVENING FOR 1 WEEK WITH FOOD. FINALLY TAKE 1 TAB TWICE PER DAY WITH FOOD. ONLY  "INCREASE DOSE IF TOLERATING MED. (Patient not taking: Reported on 12/17/2024)      [DISCONTINUED] certolizumab pegol (Cimzia) injection Inject 2 syringe (400mg) under the skin at week 0, 2, and 4 and then every 4 weeks. 6 mL 0     No current facility-administered medications on file prior to visit.        O:  Visit Vitals  /83   Pulse 77   Ht 1.676 m (5' 6\")   Wt 105 kg (232 lb)   LMP 11/16/2024   BMI 37.45 kg/m²   OB Status Having periods   Smoking Status Never   BSA 2.21 m²         PE:  Constitutional: NAD, well groomed. A/Ox3. Cooperative, obese  Skin/Hair: Warm, dry skin. Early acanthosis noted over posterior neck and b/l knuckles , no skin tags  HEENT: CHANDA, EOMI, Anicteric scleras. Moist oral mucosa. Neg Chvostek sign. Fundoscopic exam normal (8/2023)  Neck: Soft, supple. Non tender, full ROM, no lymphadenopathy. Thyroid gland palpation: 20gm, smooth, nontender  Cardiovascular: RRR, no rub or murmurs.  Respiratory: CTAB, no accessory muscle use.  Abdomen: Soft, nontender to palpation, +BS, obese, white striae  Extremities: No peripheral edema.  Neuro: Moving all extremities spontaneously. CN II-XII grossly intact. No balance or gait disturbances. Strength 5/5 throughout     LABS:    Lab Results   Component Value Date    WBC 6.9 11/18/2024    HGB 13.7 11/18/2024    HCT 41.7 11/18/2024    MCV 90 11/18/2024     11/18/2024       Chemistry    Lab Results   Component Value Date/Time     11/18/2024 1103    K 4.3 11/18/2024 1103     11/18/2024 1103    CO2 29 11/18/2024 1103    BUN 15 11/18/2024 1103    CREATININE 0.91 11/18/2024 1103    Lab Results   Component Value Date/Time    CALCIUM 9.7 11/18/2024 1103    ALKPHOS 58 11/18/2024 1103    AST 15 11/18/2024 1103    ALT 13 11/18/2024 1103    BILITOT 0.7 11/18/2024 1103         8/16/23:  Insulin resistance labs:   C-peptide 28  Insulin 15  Glucose 84  MARY-IR 3.1  HbA1C 4.6%     Pituitary labs:   DHEA-S 146  ACTH 20.8  Cortisol 11.3  Prolactin " 14.3  IGF-1 209  TSH 2.49  FT4 1.27  FSH 6.3  LH 11.9  Estradiol 34  Free T 5.1  Total T 41  SHBG 34.5     17 hydroxyprogesterone 25.23  Androstenedione 1.508     CMP (23) Glu 84, Na 143, K 4.2, Cl 104, bicarb 27, BUN 12, Cr 0.94, Ca 9.9, alb 4.8, alk phos 49, total protein 7.1, total bili 1.7, ALT 25, AST 22, CoCa 9.3         Prolactin 31 (2023, no records available to confirm, per patient report)  Prolactin 45.5 (3/8/23)     Lipid panel: Chol 183, HDL 45, , TG 92 (22)     10/17/20:  CBC: WBC 9.3, Hgb 14.8, plt 301  CMP: Na 140, K 3.8, Cl 106, bicarb 20, Ca 9.2, total protein 6.8, alb 4.3, tbili 0.7, alk phos 76, AST 16, ALT 27  HbA1C: 5.1%  Lipid panel: Chol 161, HDL 34, , TG 94  TSH: 2.22  FT4 1.23   Vit D 30.4     3/28/19:  TSH: 2.633   FT4: 1.2  CBC: WBC 7.2, Hgb 14.8, plt 269  CMP: Glu 98, Na 137, K 3.9, Cl 104, bicarb 21.9, BUN 10, Cr 0.74, tbili 0.9, AST 15, ALT 16, alk phos 67, Ca 9.1, total protein 6.5, alb 4.1      SLEEP STUDY         Narrative  Performed by: [facility]  RECORDTYPE: PSG  CPT: 57903 PSG (>=6y)  CSN: [id number]  SCHRECDATE: 10/12/2023 19:55:11  LOCATION_CODE: [id number]  PROCDUR: 515.5 min    Sleep Medicine  at [place] [address]  [phone]    Diagnostic Polysomnography Report    Patient: BLAINE HONG          MRN: [id number]                 : 2005  Study Date: 10/12/2023            Height: 167.6 cm              Age: 18  Study Type: PSG; 76110 PSG (>=6y) Weight: 106.1 kg  BMI: 37.8 Sex: Female  Referring Clinician: BRODIE HARVEY  Neck size: 38.0 cm            ESS:     DIAGNOSIS: Sleep Disorder, Unspecified (G47.9)    CMS: no  CLINICAL SUMMARY  Indication: Patient referred for fatigue, excessive daytime sleepiness,  difficulty staying asleep and falling asleep  Past Medical History: glioma of optic nerve, insulin resistance, vitamin D  deficiency, nonradiographic  spondyloarthritis, asthma, migraines,  anxiety/depression, obesity, dyslipidemia, seasonal allergies  Medications: Slynd, Quilipta, Indomethacin, Claritin, Lamotrigine,  Fluoxetine, Melatonin, Vitamin D, Calcium, Metformin, Humalog,  Methylprednisolone    TECHNICAL OBSERVATIONS / BEHAVIOR SUMMARY  [facility] SLEEP [facility] 18 year old patient came to [facility] sleep  lab for a PSG. Hookup and procedure was explained to patient. Mild  intermittent snoring heard. Mouth breathing was absent. REM supine sleep  captured. No parasomnias. REM sleep with appropriate atonia noted.    EEG / SLEEP SUMMARY  The nocturnal sleep study demonstrated acceptable sleep onset and reduced REM  sleep latency, total sleep time was 461.0 minutes, and the sleep efficiency  was 89.4%. The limited sleep EEG montage demonstrated appropriate waveforms  without epileptiform discharges.  PERIODIC LIMB MOVEMENT SUMMARY  No significant periodic limb movements of sleep were noted during this  diagnostic study.  The periodic limb movement index during sleep was 0.0/hr,  with 0.0% associated with arousals. The periodic limb movement arousal index  during sleep was 0.0/hr.  RESPIRATORY SUMMARY  This study was started and performed on room air. Supplemental oxygen was not  given. The RDI3% was 3.5 events per hour, RDI4% was 0.0/hr and LIDUMILA was  0.0/hr.  During sleep, based on RDI3%, the breathing pattern demonstrated no  significant sleep disordered breathing.  The mean oxygen saturation was 98.0%  during wake and 97.0% during sleep. The oxygen saturation was <= 88% for 0.1  minutes. The SpO2 daina was 95.0%.        CO2 analysis: N/A  CARDIAC SUMMARY  The mean heart rate during wake was 80 bpm and during sleep was 71 bpm. The  cardiac rhythm demonstrated normal sinus rhythm.    IMPRESSION  Based on the AASM recommended definition, no significant sleep-related  breathing disorder is observed in this study. The Sp02 daina was 95.0%.  Based on the CMS  definition, no significant sleep-related breathing disorder  is observed in this study.  The Sp02 daina was 95.0%.  No significant periodic limb movements of sleep were noted.  Fragmentation of sleep noted during this study appeared to be due to frequent  spontaneous arousals  RECOMMENDATIONS  There is no obvious need for a PAP titration study or intervention at this  time.  Review and advise on habits of sleep duration, regularity, timing, and  environment for sleep health.  Adequate sleep hygiene (good sleep habits) should be emphasized.  Safety management: Avoid driving vehicle or operating heavy machinery when  sleepy.               IMAGING:  -MR ANGIO HEAD WO IV CONTRAST;  4/3/2024 3:34 pm      INDICATION:  Signs/Symptoms: patient with optic glioma near chiasm and new onset  tinnitus.      COMPARISON:  Brain MRI, 03/29/2024      FINDINGS:  Asymmetric enlargement of the optic nerves and chiasm was better  evaluated previously but appears unchanged.      Anterior circulation:  The A1 segments abut the known optic pathway  glioma without evident mass effect or narrowing. There is expected  flow signal in bilateral intracranial internal carotid arteries,  bilateral carotid terminals, bilateral proximal anterior and middle  cerebral arteries. No aneurysm.      Posterior circulation:    Bilateral intracranial vertebral arteries,  vertebrobasilar junction, basilar artery and proximal posterior  cerebral arteries demonstrate expected flow signal. No aneurysm. No  abnormal flow signal in the vicinity of the inner ear structures.      IMPRESSION:  Optic pathway glioma adjacent to the proximal anterior cerebral  arteries without associated mass effect or narrowing, no  flow-limiting stenosis or occlusion in the head.      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       #Elevated prolactin   ::31 in January 2023 and 45.5 in March 2023, August 2023: 14  ::Clinically : irregular menses, fatigue   :: likely insufficient Estrogen level from OCPs contributing to irregular bleeding  -recommend following up with GYN (has an upcoming azael in feb 2025), consider OCPs with higher estrogen      #Insulin resistance  ::Given obesity, family history of diabetes, and early acanthosis on exam  ::MARY IR 3.1 on labs from 8/16/23, suggestive of significant insulin resistance  -No improvement with metformin -> will no reRX  -Advised on lifestyle changes to promote weight loss      #Vitamin D deficiency  ::Positive chovstek's, CoCa 9.5  -c/w vitamin D 5000IU daily through multiple sources     RTC PRN     Patient seen, examined, and discussed with Dr. Figueredo who agrees with the management plan.

## 2024-12-20 DIAGNOSIS — M45.A0 NON-RADIOGRAPHIC AXIAL SPONDYLOARTHRITIS (MULTI): ICD-10-CM

## 2024-12-20 RX ORDER — IXEKIZUMAB 80 MG/ML
80 INJECTION, SOLUTION SUBCUTANEOUS
Qty: 1 ML | Refills: 2 | Status: SHIPPED | OUTPATIENT
Start: 2024-12-20

## 2024-12-23 PROCEDURE — RXMED WILLOW AMBULATORY MEDICATION CHARGE

## 2025-01-02 ENCOUNTER — SPECIALTY PHARMACY (OUTPATIENT)
Dept: PHARMACY | Facility: CLINIC | Age: 20
End: 2025-01-02

## 2025-01-04 ENCOUNTER — PHARMACY VISIT (OUTPATIENT)
Dept: PHARMACY | Facility: CLINIC | Age: 20
End: 2025-01-04
Payer: MEDICAID

## 2025-01-08 DIAGNOSIS — C72.30 GLIOMA OF OPTIC NERVE: ICD-10-CM

## 2025-01-14 ENCOUNTER — OFFICE VISIT (OUTPATIENT)
Dept: RHEUMATOLOGY | Facility: CLINIC | Age: 20
End: 2025-01-14
Payer: COMMERCIAL

## 2025-01-14 VITALS — WEIGHT: 235 LBS | SYSTOLIC BLOOD PRESSURE: 128 MMHG | DIASTOLIC BLOOD PRESSURE: 72 MMHG | BODY MASS INDEX: 37.93 KG/M2

## 2025-01-14 DIAGNOSIS — M45.A0 NON-RADIOGRAPHIC AXIAL SPONDYLOARTHRITIS (MULTI): Primary | ICD-10-CM

## 2025-01-14 DIAGNOSIS — G89.29 OTHER CHRONIC PAIN: ICD-10-CM

## 2025-01-14 PROCEDURE — 99213 OFFICE O/P EST LOW 20 MIN: CPT | Performed by: INTERNAL MEDICINE

## 2025-01-14 RX ORDER — ARIPIPRAZOLE 2 MG/1
2 TABLET ORAL DAILY
COMMUNITY

## 2025-01-14 NOTE — PROGRESS NOTES
Recheck  Non-Radiographic SpA   Doing well with exception of continued rib pain.  Refuses pain management at this time.    HPI - she has B lower post rib pain but no other pain.  No swelling.  AM stiffness 30 min.   +CP - she thinks she pulled a muscle at work, and it's better now other than occ pain.  No resp, or GI.  She is overall doing better    PE  NAD  RRR no r/m/g  CTA  No edema  No synovitis  Rib tenderness but NT and no pain with ROM otherwise    A/P - Nonradiographic spA and chronic pain, gen doing better other than oingoind rib pain  She declines pain mgmt  Reviewed prev labs  No need to check labs today  Follow up 3 mo - if stable, space visits

## 2025-01-23 DIAGNOSIS — G43.009 MIGRAINE WITHOUT AURA AND WITHOUT STATUS MIGRAINOSUS, NOT INTRACTABLE: ICD-10-CM

## 2025-02-07 ENCOUNTER — SPECIALTY PHARMACY (OUTPATIENT)
Dept: PHARMACY | Facility: CLINIC | Age: 20
End: 2025-02-07

## 2025-02-13 ENCOUNTER — SPECIALTY PHARMACY (OUTPATIENT)
Dept: PHARMACY | Facility: CLINIC | Age: 20
End: 2025-02-13

## 2025-02-19 ENCOUNTER — TELEPHONE (OUTPATIENT)
Dept: NEUROLOGY | Facility: CLINIC | Age: 20
End: 2025-02-19
Payer: COMMERCIAL

## 2025-02-19 DIAGNOSIS — G43.009 MIGRAINE WITHOUT AURA AND WITHOUT STATUS MIGRAINOSUS, NOT INTRACTABLE: ICD-10-CM

## 2025-02-19 RX ORDER — METHYLPREDNISOLONE 4 MG/1
TABLET ORAL
Qty: 21 TABLET | Refills: 0 | Status: SHIPPED | OUTPATIENT
Start: 2025-02-19 | End: 2025-02-25

## 2025-02-24 DIAGNOSIS — M45.A0 NON-RADIOGRAPHIC AXIAL SPONDYLOARTHRITIS (MULTI): ICD-10-CM

## 2025-02-24 RX ORDER — IXEKIZUMAB 80 MG/ML
80 INJECTION, SOLUTION SUBCUTANEOUS
Qty: 1 ML | Refills: 2 | Status: SHIPPED | OUTPATIENT
Start: 2025-02-24 | End: 2025-02-26 | Stop reason: SDUPTHER

## 2025-02-26 DIAGNOSIS — M45.A0 NON-RADIOGRAPHIC AXIAL SPONDYLOARTHRITIS (MULTI): ICD-10-CM

## 2025-02-26 RX ORDER — IXEKIZUMAB 80 MG/ML
80 INJECTION, SOLUTION SUBCUTANEOUS
Qty: 3 ML | Refills: 0 | Status: SHIPPED | OUTPATIENT
Start: 2025-02-26 | End: 2025-02-28 | Stop reason: SDUPTHER

## 2025-02-27 ENCOUNTER — PROCEDURE VISIT (OUTPATIENT)
Dept: NEUROLOGY | Facility: CLINIC | Age: 20
End: 2025-02-27
Payer: COMMERCIAL

## 2025-02-27 VITALS — HEART RATE: 72 BPM | SYSTOLIC BLOOD PRESSURE: 126 MMHG | TEMPERATURE: 97 F | DIASTOLIC BLOOD PRESSURE: 68 MMHG

## 2025-02-27 DIAGNOSIS — G43.011 INTRACTABLE MIGRAINE WITHOUT AURA AND WITH STATUS MIGRAINOSUS: Primary | ICD-10-CM

## 2025-02-27 PROCEDURE — 99213 OFFICE O/P EST LOW 20 MIN: CPT

## 2025-02-27 RX ORDER — KETOROLAC TROMETHAMINE 10 MG/1
10 TABLET, FILM COATED ORAL EVERY 6 HOURS
Qty: 20 TABLET | Refills: 0 | Status: SHIPPED | OUTPATIENT
Start: 2025-02-27 | End: 2025-03-04

## 2025-02-27 RX ORDER — KETOROLAC TROMETHAMINE 30 MG/ML
60 INJECTION, SOLUTION INTRAMUSCULAR; INTRAVENOUS ONCE
Status: COMPLETED | OUTPATIENT
Start: 2025-02-27 | End: 2025-02-27

## 2025-02-27 RX ADMIN — KETOROLAC TROMETHAMINE 60 MG: 30 INJECTION, SOLUTION INTRAMUSCULAR; INTRAVENOUS at 15:44

## 2025-02-27 ASSESSMENT — PAIN SCALES - GENERAL: PAINLEVEL_OUTOF10: 7

## 2025-02-27 NOTE — PROGRESS NOTES
Chief Complaint   Patient presents with    Migraine     Toradol protocol      Subjective     HPI  Radha Pereira  is a 20 y.o. year old female who presents with chief complaint Intractable migraine without aura and with status migrainosus [G43.011]    Radha sent a [employer] message on 2/19/25 stating she was on day 3 of a migraine. Dr. Paige sent in a Medrol Dospak.   She sent another [employer] message on 2/26/25 stating the Dospak was not helpful.   She is here today for Toradol protocol to help break migraine cycle.    Current Outpatient Medications:     ARIPiprazole (Abilify) 2 mg tablet, Take 1 tablet (2 mg) by mouth once daily., Disp: , Rfl:     atogepant (Qulipta) 60 mg tablet tablet, Take 1 tablet (60 mg) by mouth once daily., Disp: 30 tablet, Rfl: 11    calcium carbonate (Oscal) 500 mg calcium (1,250 mg) tablet, Take by mouth once daily., Disp: , Rfl:     cholecalciferol (Vitamin D-3) 50 MCG (2000 UT) tablet, Take by mouth., Disp: , Rfl:     digital therapeutic,EBENEZER device misc, 45 minutes if needed (migraine)., Disp: 1 each, Rfl: 11    drospirenone-e.estradioL-lm.FA (Saray Barrett) 3-0.02-0.451 mg (24) (4), Take 1 tablet by mouth early in the morning.., Disp: , Rfl:     fish oil concentrate (Omega-3) 120-180 mg capsule, Take by mouth once daily., Disp: , Rfl:     FLUoxetine (PROzac) 10 mg capsule, TAKE 1 CAPSULE BY MOUTH ONCE DAILY. TAKE WITH 20MG CAPSULE FOR TOTAL OF 30MG DAILY, Disp: , Rfl:     FLUoxetine (PROzac) 20 mg capsule, Take 1 capsule (20 mg) by mouth once daily. With 10 mg for 30 mg daily, Disp: , Rfl:     gabapentin (Neurontin) 100 mg capsule, 1 capsule (100 mg)., Disp: , Rfl:     indomethacin SR (Indocin SR) 75 mg ER capsule, TAKE 1 CAPSULE BY MOUTH ONCE DAILY WITH A MEAL, Disp: 30 capsule, Rfl: 3    ixekizumab (Taltz Autoinjector) 80 mg/mL injection, Inject 80mg (1 pen) under the skin every 28 (twenty-eight) days., Disp: 3 mL, Rfl: 0    ketorolac (Toradol) 10 mg tablet, Take 1 tablet (10 mg) by  mouth every 6 hours for 5 days., Disp: 20 tablet, Rfl: 0    lamoTRIgine (LaMICtal) 200 mg tablet, Take 1 tablet (200 mg) by mouth once daily., Disp: 30 tablet, Rfl: 11    loratadine 10 mg capsule, Take by mouth., Disp: , Rfl:     melatonin 3 mg tablet, Take 2 tablets (6 mg) by mouth once daily at bedtime., Disp: , Rfl:     metFORMIN (Glucophage) 1,000 mg tablet, , Disp: , Rfl:     methocarbamol (Robaxin) 750 mg tablet, Take 1 tablet (750 mg) by mouth 2 times a day. Until back neck and head pain improve., Disp: 720 tablet, Rfl: 0    montelukast (Singulair) 10 mg tablet, Take 1 tablet (10 mg) by mouth once daily at bedtime., Disp: 30 tablet, Rfl: 11    multivitamin tablet, Take by mouth., Disp: , Rfl:     ondansetron (Zofran) 4 mg tablet, TAKE 1 TABLET BY MOUTH EVERY 8 HOURS AS NEEDED FOR NAUSEA AND VOMITING FOR 7 DAYS, Disp: 30 tablet, Rfl: 3    Reyvow 100 mg tablet, TAKE 1 TO 2 TABLETS BY MOUTH ONCE DAILY AS NEEDED FOR HEADACHE TO LAST 30 DAYS, Disp: 8 tablet, Rfl: 4    rizatriptan (Maxalt) 10 mg tablet, TAKE 1 TABLET BY MOUTH AT ONSET OF HEADEACHE, MAY REPEAT EVERY 2 HOURS AS NEEDED, MAXIMUM OF 3 TABLETS PER 24 HOURS, Disp: 9 tablet, Rfl: 6    SUMAtriptan (Imitrex) 25 mg tablet, Take by mouth., Disp: , Rfl:     SUMAtriptan (Imitrex) 6 mg/0.5 mL injection, Inject under the skin., Disp: , Rfl:     Ventolin HFA 90 mcg/actuation inhaler, Inhale 2 puffs every 6 hours if needed for wheezing., Disp: , Rfl:     Current Facility-Administered Medications:     ketorolac (Toradol) injection 60 mg, 60 mg, intramuscular, Once, Renae Lilly, APRN-CNP      Allergies   Allergen Reactions    Cobalt Unknown    Mite Extract Unknown    Amoxicillin Rash    Penicillins Hives and Rash     Vitals:    02/27/25 1539   BP: 126/68   Pulse: 72   Temp: 36.1 °C (97 °F)   TempSrc: Temporal   PainSc:   7   PainLoc: Head  Comment: migraine       Assessment & Plan  Intractable migraine without aura and with status migrainosus    Orders:     ketorolac (Toradol) 10 mg tablet; Take 1 tablet (10 mg) by mouth every 6 hours for 5 days.    ketorolac (Toradol) injection 60 mg

## 2025-02-27 NOTE — PATIENT INSTRUCTIONS
You received Toradol (ketorolac) today for your severe headache.  We are going to continue with 5 days of pills starting tomorrow to break your headache cycle.  Take 1 pill with breakfast, lunch, dinner & at bedtime for 5 days.    Please take with food.    Try to avoid taking your triptan or anti inflammatory medication during this time.    If you have any nausea or diarrhea with the medication, stop & send a message via [employer], or call the next business day.

## 2025-02-28 DIAGNOSIS — M45.A0 NON-RADIOGRAPHIC AXIAL SPONDYLOARTHRITIS (MULTI): ICD-10-CM

## 2025-02-28 RX ORDER — IXEKIZUMAB 80 MG/ML
80 INJECTION, SOLUTION SUBCUTANEOUS
Qty: 3 ML | Refills: 1 | Status: SHIPPED | OUTPATIENT
Start: 2025-02-28

## 2025-03-04 NOTE — PROGRESS NOTES
Patient ID: Radha Pereira is a 20 y.o. female.  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 She returns today for repeat imaging and follow-up, she was last seen in our clinic in August 2023. In clinic with her mom.     Since her last visit, she has had not significant medical issues since last visit.  Continues to have headaches, which are not new and saw Neurology in February. She states she has not received her new migraine medication as she recently got new insurance and is having issues filling her prescriptions. She reports nausea with her migraines which is not new. She denies vision changes or diplopia. Menses are irregular which is not new. Energy and appetite have been good. No polyuria or polydipsia.     She is in college at [employer], studying education, will complete college in another year. Recently bought a house with her fiance. She takes supplements including vitamin C, apple cider vinegar and ashwaganda.     Saw Optho 12/2024, last Endo apt 12/2024.   No changes to PMH, SH, or FH since visit last year .      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 tumor 6/19/20     Glioma of optic nerve   8/31/2023 Initial Diagnosis    Glioma of optic nerve     11/19/2024 Cancer Staged    Staging form: Brain and Spinal Cord, AJCC Version 9, Clinical: No stage assigned - Signed by Annabel Paige MD on 11/19/2024, Staging comments: Followed by Dr drew         Past Medical / Family / Social History:  Past Medical, Family, and Social History reviewed and unchanged since  "the last visit.    Review of Systems   Constitutional: Negative.    HENT:  Negative.     Eyes: Negative.    Respiratory: Negative.     Cardiovascular: Negative.    Gastrointestinal:  Positive for nausea (with migraines).   Endocrine: Negative.         Irregular menses, which is not new   Genitourinary: Negative.     Musculoskeletal: Negative.    Skin: Negative.    Neurological:  Positive for headaches.   Hematological: Negative.    Psychiatric/Behavioral:  The patient is nervous/anxious.    All other systems reviewed and are negative.      Home Medication Adherence:  Adherence with home medication regimen:  insurance recently changed and she is having difficulty getting some of her medications approved   Adherence comments: see above  Adherence information obtained from: Patient    Oral Chemotherapy / Oncology Related Therapy:  Is the patient prescribed oral chemotherapy or oncology related therapy:  No      OBJECTIVE:    VS:  /77 (BP Location: Right arm, Patient Position: Sitting, BP Cuff Size: Large adult)   Pulse 87   Temp 36.6 °C (97.9 °F) (Oral)   Resp 18   Ht 1.656 m (5' 5.2\")   Wt 110 kg (243 lb 6.2 oz)   BMI 40.26 kg/m²   BSA: 2.25 meters squared  Pain:       Physical Exam  Vitals reviewed.   HENT:      Head: Normocephalic.      Right Ear: External ear normal.      Left Ear: External ear normal.      Nose: Nose normal.      Mouth/Throat:      Mouth: Mucous membranes are moist.      Pharynx: Oropharynx is clear.   Eyes:      Extraocular Movements: Extraocular movements intact.      Conjunctiva/sclera: Conjunctivae normal.      Pupils: Pupils are equal, round, and reactive to light.   Cardiovascular:      Rate and Rhythm: Regular rhythm. Tachycardia present.      Pulses: Normal pulses.      Heart sounds: Normal heart sounds.   Pulmonary:      Effort: Pulmonary effort is normal.      Breath sounds: Normal breath sounds.   Abdominal:      General: Bowel sounds are normal.      Palpations: Abdomen is " soft.   Musculoskeletal:         General: Normal range of motion.      Cervical back: Normal range of motion.   Skin:     General: Skin is warm.   Neurological:      General: No focal deficit present.      Mental Status: She is alert.      Comments: CN II-XII intact, face symmetric, normal rapid alternating movements bilaterally, gait steady.    Psychiatric:         Mood and Affect: Mood normal.       Performance Status:       Imaging:  The pertinent imaging results were reviewed and discussed with the patient.  MR brain w and wo IV contrast    Result Date: 3/14/2025  Interpreted By:  Miguel Angel Mosley, STUDY: MR BRAIN W AND WO IV CONTRAST; ;  3/14/2025 12:01 pm   INDICATION: Signs/Symptoms:18 yo right temporal lesion (presumptive angioma) and right optic pathway glioma.  Surveillence imaging.   ,C72.30 Malignant neoplasm of unspecified optic nerve (Multi)   COMPARISON: MRI brain from 03/29/2024.   ACCESSION NUMBER(S): [id number]   ORDERING CLINICIAN: RICCARDO CARABALLO   TECHNIQUE: MRI of the brain was performed with the acquisition of axial diffusion-weighted, axial T1, axial FLAIR, axial T2 gradient echo, axial T2 fat saturated, axial T1 fat saturated postcontrast sequence, and volumetric axial T1 post contrast sequence with multiplanar reformats.   Contrast: 20 mL of Dotarem was injected intravenously.   FINDINGS: There is no acute intracranial hemorrhage or infarct. There is no abnormal extra-axial fluid collection or mass effect.   Stable somewhat ill-defined 1.1 cm focus susceptibility artifact located within the mesial right temporal lobe with associated 0.8 cm focus of enhancement. There is no surrounding mass effect or striking surrounding signal abnormality.   Few foci of T2 hyperintensity within the bilateral cerebral hemispheric white matter are unchanged and may be incidental findings.   Visualized paranasal sinuses are essentially clear.   Findings regarding the orbits including the optic chiasm and  optic nerves will be detailed in a separate report.       Stable region of signal abnormality and enhancement located within the right temporal lobe which may reflect a vascular malformation.   This study was interpreted at [facility].   MACRO: None   Signed by: Miguel Angel Mosley 3/14/2025 12:19 PM Dictation workstation:   [id number]       ASSESSMENT and PLAN:  19yo with headaches and incidental findings of right temporal lesion (presumptive angioma) and right optic pathway glioma on MRI 6/2020.    Radha is well appearing in clinic today, MRI brain reveals stable signal abnormality in R temporal lobe, MRI orbits final report is pending, although imaging appears stable.     Oncology:    - No change to imaging or clinical evaluation today.    - She is now ~ 5 years from initial MRI in 2020 with overall stable imaging. Discussed the option of continuing with annual surveillance MRI imaging vs every other year imaging. She would prefer annual at this time. Will plan for MRI orbits in 1 year.    - She should continue with routine Optho exams & Endocrine evaluations due to OPG.       Neurology:  - She is followed by neurology for migraines and has an upcoming apt 5/6/25    Psych/Social:    - No concerns for neuro-cognitive deficits given history of good school performance.  No restrictions, only would consider additional evaluations if she or family would like or if changes in school performance.  College is going well    Anticipatory Guidance:  - Encouraged establishing care with PCP, seeing dentist every 6 months, routine GYN visits and continuing to be followed by Optho, Endo and Neuro.  - Encouraged reaching out to subspecialty teams who prescribes her medications if she is having issues with new insurance.      RTC: summer 2026 (works better with her school schedule) for MRI orbits and follow-up.,    Rebecca Brady, APRN-CNP, DNP

## 2025-03-06 ENCOUNTER — TELEPHONE (OUTPATIENT)
Dept: NEUROLOGY | Facility: CLINIC | Age: 20
End: 2025-03-06
Payer: COMMERCIAL

## 2025-03-06 DIAGNOSIS — G43.009 MIGRAINE WITHOUT AURA AND WITHOUT STATUS MIGRAINOSUS, NOT INTRACTABLE: Primary | ICD-10-CM

## 2025-03-06 NOTE — TELEPHONE ENCOUNTER
From Renae Lilly NP:  Please call the patient. Tell her she can use the rizatriptan. Also tell her that I sent Ubrelvy to  specialty pharmacy to see if that medication works for migraine rescue. (Use on it's own for the first few migraines to see how effective it is). Thank you!     Patient was called and I informed her of the above. I did advise it might take a few days to receive a PA for the Ubrelvy, but once received they would reach out to her for shipping. She states understanding.   I told her to reach back out if she had any questions or concerns.

## 2025-03-12 DIAGNOSIS — G43.709 CHRONIC MIGRAINE WITHOUT AURA WITHOUT STATUS MIGRAINOSUS, NOT INTRACTABLE: ICD-10-CM

## 2025-03-13 RX ORDER — MONTELUKAST SODIUM 10 MG/1
10 TABLET ORAL NIGHTLY
Qty: 90 TABLET | Refills: 3 | Status: SHIPPED | OUTPATIENT
Start: 2025-03-13 | End: 2026-03-08

## 2025-03-14 ENCOUNTER — HOSPITAL ENCOUNTER (OUTPATIENT)
Dept: RADIOLOGY | Facility: HOSPITAL | Age: 20
Discharge: HOME | End: 2025-03-14
Payer: COMMERCIAL

## 2025-03-14 ENCOUNTER — HOSPITAL ENCOUNTER (OUTPATIENT)
Dept: PEDIATRIC HEMATOLOGY/ONCOLOGY | Facility: HOSPITAL | Age: 20
Discharge: HOME | End: 2025-03-14
Payer: COMMERCIAL

## 2025-03-14 VITALS
TEMPERATURE: 97.9 F | DIASTOLIC BLOOD PRESSURE: 77 MMHG | RESPIRATION RATE: 18 BRPM | BODY MASS INDEX: 40.55 KG/M2 | WEIGHT: 243.39 LBS | HEART RATE: 87 BPM | SYSTOLIC BLOOD PRESSURE: 113 MMHG | HEIGHT: 65 IN

## 2025-03-14 DIAGNOSIS — C72.30 GLIOMA OF OPTIC NERVE: ICD-10-CM

## 2025-03-14 DIAGNOSIS — M45.A0 NON-RADIOGRAPHIC AXIAL SPONDYLOARTHRITIS (MULTI): ICD-10-CM

## 2025-03-14 PROCEDURE — 70553 MRI BRAIN STEM W/O & W/DYE: CPT

## 2025-03-14 PROCEDURE — 70543 MRI ORBT/FAC/NCK W/O &W/DYE: CPT

## 2025-03-14 PROCEDURE — 2550000001 HC RX 255 CONTRASTS: Performed by: PEDIATRICS

## 2025-03-14 PROCEDURE — A9575 INJ GADOTERATE MEGLUMI 0.1ML: HCPCS | Performed by: PEDIATRICS

## 2025-03-14 RX ORDER — GADOTERATE MEGLUMINE 376.9 MG/ML
20 INJECTION INTRAVENOUS
Status: COMPLETED | OUTPATIENT
Start: 2025-03-14 | End: 2025-03-14

## 2025-03-14 RX ORDER — IXEKIZUMAB 80 MG/ML
80 INJECTION, SOLUTION SUBCUTANEOUS
Qty: 3 ML | Refills: 0 | Status: SHIPPED | OUTPATIENT
Start: 2025-03-14

## 2025-03-14 RX ADMIN — GADOTERATE MEGLUMINE 20 ML: 376.9 INJECTION INTRAVENOUS at 11:31

## 2025-03-14 ASSESSMENT — ENCOUNTER SYMPTOMS
CARDIOVASCULAR NEGATIVE: 1
ENDOCRINE NEGATIVE: 1
OCCASIONAL FEELINGS OF UNSTEADINESS: 0
RESPIRATORY NEGATIVE: 1
HEMATOLOGIC/LYMPHATIC NEGATIVE: 1
LOSS OF SENSATION IN FEET: 0
NAUSEA: 1
HEADACHES: 1
CONSTITUTIONAL NEGATIVE: 1
NERVOUS/ANXIOUS: 1
MUSCULOSKELETAL NEGATIVE: 1
EYES NEGATIVE: 1
DEPRESSION: 0

## 2025-03-14 ASSESSMENT — COLUMBIA-SUICIDE SEVERITY RATING SCALE - C-SSRS
1. IN THE PAST MONTH, HAVE YOU WISHED YOU WERE DEAD OR WISHED YOU COULD GO TO SLEEP AND NOT WAKE UP?: NO
6. HAVE YOU EVER DONE ANYTHING, STARTED TO DO ANYTHING, OR PREPARED TO DO ANYTHING TO END YOUR LIFE?: NO
2. HAVE YOU ACTUALLY HAD ANY THOUGHTS OF KILLING YOURSELF?: NO

## 2025-03-14 ASSESSMENT — PAIN SCALES - GENERAL: PAINLEVEL_OUTOF10: 4

## 2025-03-17 DIAGNOSIS — G43.709 CHRONIC MIGRAINE WITHOUT AURA WITHOUT STATUS MIGRAINOSUS, NOT INTRACTABLE: ICD-10-CM

## 2025-03-17 DIAGNOSIS — M54.2 CERVICALGIA: ICD-10-CM

## 2025-03-17 DIAGNOSIS — M45.A0 NON-RADIOGRAPHIC AXIAL SPONDYLOARTHRITIS (MULTI): ICD-10-CM

## 2025-03-17 RX ORDER — IXEKIZUMAB 80 MG/ML
80 INJECTION, SOLUTION SUBCUTANEOUS
Qty: 3 ML | Refills: 0 | Status: SHIPPED | OUTPATIENT
Start: 2025-03-17

## 2025-03-17 RX ORDER — LAMOTRIGINE 200 MG/1
TABLET ORAL DAILY
Qty: 30 TABLET | Refills: 11 | Status: SHIPPED | OUTPATIENT
Start: 2025-03-17

## 2025-03-18 ENCOUNTER — SPECIALTY PHARMACY (OUTPATIENT)
Dept: PHARMACY | Facility: CLINIC | Age: 20
End: 2025-03-18

## 2025-03-26 PROCEDURE — RXMED WILLOW AMBULATORY MEDICATION CHARGE

## 2025-03-27 ENCOUNTER — PHARMACY VISIT (OUTPATIENT)
Dept: PHARMACY | Facility: CLINIC | Age: 20
End: 2025-03-27
Payer: MEDICARE

## 2025-04-08 DIAGNOSIS — G43.709 CHRONIC MIGRAINE WITHOUT AURA WITHOUT STATUS MIGRAINOSUS, NOT INTRACTABLE: ICD-10-CM

## 2025-04-09 RX ORDER — INDOMETHACIN 75 MG/1
CAPSULE, EXTENDED RELEASE ORAL
Qty: 30 CAPSULE | Refills: 3 | Status: SHIPPED | OUTPATIENT
Start: 2025-04-09

## 2025-04-09 RX ORDER — ARIPIPRAZOLE 5 MG/1
5 TABLET ORAL NIGHTLY
COMMUNITY
Start: 2025-03-19

## 2025-04-15 ENCOUNTER — APPOINTMENT (OUTPATIENT)
Dept: RHEUMATOLOGY | Facility: CLINIC | Age: 20
End: 2025-04-15
Payer: COMMERCIAL

## 2025-05-03 ENCOUNTER — SPECIALTY PHARMACY (OUTPATIENT)
Dept: PHARMACY | Facility: CLINIC | Age: 20
End: 2025-05-03

## 2025-05-06 ENCOUNTER — APPOINTMENT (OUTPATIENT)
Dept: NEUROLOGY | Facility: CLINIC | Age: 20
End: 2025-05-06
Payer: COMMERCIAL

## 2025-05-06 VITALS
DIASTOLIC BLOOD PRESSURE: 76 MMHG | RESPIRATION RATE: 20 BRPM | HEART RATE: 68 BPM | SYSTOLIC BLOOD PRESSURE: 124 MMHG | TEMPERATURE: 97.4 F

## 2025-05-06 DIAGNOSIS — G43.009 MIGRAINE WITHOUT AURA AND WITHOUT STATUS MIGRAINOSUS, NOT INTRACTABLE: Primary | ICD-10-CM

## 2025-05-06 DIAGNOSIS — G89.29 CHRONIC BILATERAL LOW BACK PAIN WITH RIGHT-SIDED SCIATICA: ICD-10-CM

## 2025-05-06 DIAGNOSIS — M54.41 CHRONIC BILATERAL LOW BACK PAIN WITH RIGHT-SIDED SCIATICA: ICD-10-CM

## 2025-05-06 DIAGNOSIS — M45.A0 NON-RADIOGRAPHIC AXIAL SPONDYLOARTHRITIS (MULTI): ICD-10-CM

## 2025-05-06 DIAGNOSIS — D18.00 CAVERNOUS ANGIOMA: ICD-10-CM

## 2025-05-06 DIAGNOSIS — G44.099 TRIGEMINAL AUTONOMIC CEPHALGIAS: ICD-10-CM

## 2025-05-06 DIAGNOSIS — C72.30 GLIOMA OF OPTIC NERVE: ICD-10-CM

## 2025-05-06 PROCEDURE — 99215 OFFICE O/P EST HI 40 MIN: CPT | Performed by: PSYCHIATRY & NEUROLOGY

## 2025-05-06 RX ORDER — GABAPENTIN 100 MG/1
CAPSULE ORAL
Qty: 60 CAPSULE | Refills: 11 | Status: SHIPPED | OUTPATIENT
Start: 2025-05-06

## 2025-05-06 RX ORDER — GABAPENTIN 100 MG/1
100 CAPSULE ORAL NIGHTLY
Qty: 30 CAPSULE | Refills: 11 | Status: SHIPPED | OUTPATIENT
Start: 2025-05-06 | End: 2025-05-06

## 2025-05-06 ASSESSMENT — PATIENT HEALTH QUESTIONNAIRE - PHQ9
1. LITTLE INTEREST OR PLEASURE IN DOING THINGS: NOT AT ALL
SUM OF ALL RESPONSES TO PHQ9 QUESTIONS 1 AND 2: 0
2. FEELING DOWN, DEPRESSED OR HOPELESS: NOT AT ALL

## 2025-05-06 ASSESSMENT — PAIN SCALES - GENERAL: PAINLEVEL_OUTOF10: 3

## 2025-05-06 NOTE — PROGRESS NOTES
"  Has not received Nerivio device. She called and was told it needed approval. Office never received a request.   MD that was prescribing gabapentin 100 mg, PCP at [facility], left and she cannot access any longer. Has been out. Would like this office to prescribe for nerve pain , if possible. Has noticed nerve pain low back to right leg has worsened.     MIgraine  Is on  Qulipta  for headaches. . Notices if runs out, migraines increase.     Has not been using Reyvow and needs new PA. Has failed sumatriptan and rizatriptan per step guidelines.   Ubrelvy has been working well for migraine treatment.     Experiencing 1-2 migraines per week. Symptoms last longer even though treatments are working.     Treats with Ubrelvy first line. Treats in 30 min to start and migraine free in several hours.   Has been trying to stick with Ubrelvy and has not been using rizatriptan because giving Ubrelvy a stand alone trial.   Feels Ubrelvy works about the same as rizatriptan. But , then, does endorse Ubrelvy works a bit better as does not have to repeat with second dose as often. With rizatriptan, usually had to repeat the dose for complete relief.     Occasional regular headaches. Holocephalic.     Migraine occurs around and behind right eye. Describes as aching and pressure. Sometimes stabbing.   light sensitivity, nausea-Ondansetron helpful.   Triggers are none in particular    TAC     Managed with indocin and lamictal.   also r periorbital but radiates in to cheek and is associated   Debility 4-5 days per month and could not go to school. Still has eye droop hours later.       IIH  Per dr Rincon. No visual findings no positional headache currently does have \"ocean noise occasionally\"    Optic nerve Glioma  Managed by Dr Fuentes repeat imaging     IMPRESSION:  1. Stable size of nonenhancing expansile lesion involving cisternal  segment of the right optic nerve and right aspect of the optic  chiasm, presumed to represent an optic " "pathway glioma. No new  intraorbital or optic pathway masses.  2. Unchanged appearance of focal susceptibility artifact and  enhancement within the mesial right temporal lobe, which may  represent a vascular malformation.      I personally reviewed the images/study and I agree with the findings  as stated. This study was interpreted at [facility], [place], [place].      MACRO:  None      Signed by: Radhames Moralez 3/17/2025 10:10 AM  Dictation workstation:   [id number]    IMPRESSION:  Stable region of signal abnormality and enhancement located within  the right temporal lobe which may reflect a vascular malformation.      This study was interpreted at [facility].      MACRO:  None      Signed by: Miguel Angel Mosley 3/14/2025 12:19 PM  Dictation workstation:   [id number]    Dr Rincon felt vision was good  She has good vision. I do not see changes in vision or relative afferent pupillary defect related to the optic nerve glioma.       Back pain  Dr Gilmore \"  Nonradiographic spA and chronic pain, gen doing better other than ongoing rib pain on Taltz  She declines pain mgmt  Reviewed prev labs  No need to check labs today  Follow up 3 mo - if stable, space visits\"    Has done PT which made pain worse    Mri shows disc, pt refuses surgical or pain management intervention because the gabapentin works so well.   IMPRESSION:  At L4-L5 there is a central/left subarticular disc protrusion with  abutment without displacement of the descending left L5 nerve root.  Please correlate clinically for symptoms of left L5 radiculopathy.     No significant spinal canal or neural foraminal stenosis.     Subtle increased STIR signal along the sacroiliac joint, left greater  than right.    Has taken Tylenol in past. Wants to check it is ok to take for back pain as health azael on watch noted to  check with provider.     Sinus Infections managed with singulair. No " "recurrance      Sleeps is \"ok\". Tries for 6-8 hours. Trouble falling asleep.  Takes melatonin    Working at [employer] part time. College full time. Studying education.   Mood managed well with Prozac and ability.   Psych CNP every 3 months.   No current counseling.     Prolactinoma- none my MRI or lab does not need to see DR palacios        To review what we discussed today   Assessment/Plan   Problem List Items Addressed This Visit       Chronic low back pain - Primary    MRI shows disc. Gabapentin 100 mg is helping does not want intervention currently         Relevant Medications    gabapentin (Neurontin) 100 mg capsule    Glioma of optic nerve    DR Streeter continues to manage.          Migraine without aura and without status migrainosus, not intractable    Qulipta and  ubrelvyt riptans and Reyvow are managing. It is ok to take with Ubrelvy with other medications only limit is Reyvow quantity and triptans 22 in a 24 hour period.          Non-radiographic axial spondyloarthritis (Multi)    Followed by Dr robertson.          Trigeminal autonomic cephalgias    Lamictal and indocin for this When pain is severe with eye droop and cheek pain add gabapentin as neeeded         Cavernous angioma    Suspect of r temporal lobe. Screening with yearly optic glioma stusies by Dr Fuentes            Your next appointment with me is  6 months    Thank you for visiting the office of Dr Annabel Paige. It was a pleasure working with you today.   Stella Jackson[address] Mon-Thursday  [facility] 5th floor [facility] Fridays  Our office phone number is [phone]. You can use this number to leave messages for Guillermina or to schedule or reschedule an appointment or request refills.  If you were seen on Thursday afternoon or Friday our office will call on Monday or Tuesday to reschedule your appointment. If you do not receive a call please call us.   We are also available for messages on my chart. We make every effort to respond to " your concerns by the end of the next business day.

## 2025-05-06 NOTE — ASSESSMENT & PLAN NOTE
Lamictal and indocin for this When pain is severe with eye droop and cheek pain add gabapentin as neeeded

## 2025-05-06 NOTE — ASSESSMENT & PLAN NOTE
Qulipta and  ubrelvyt riptans and Reyvow are managing. It is ok to take with Ubrelvy with other medications only limit is Reyvow quantity and triptans 22 in a 24 hour period.

## 2026-01-02 ENCOUNTER — APPOINTMENT (OUTPATIENT)
Dept: OPHTHALMOLOGY | Facility: CLINIC | Age: 21
End: 2026-01-02
Payer: COMMERCIAL